# Patient Record
Sex: FEMALE | Race: BLACK OR AFRICAN AMERICAN | Employment: UNEMPLOYED | URBAN - METROPOLITAN AREA
[De-identification: names, ages, dates, MRNs, and addresses within clinical notes are randomized per-mention and may not be internally consistent; named-entity substitution may affect disease eponyms.]

---

## 2017-05-09 ENCOUNTER — HOSPITAL ENCOUNTER (EMERGENCY)
Age: 42
Discharge: HOME | End: 2017-05-09
Attending: EMERGENCY MEDICINE
Payer: MEDICARE

## 2017-05-09 VITALS
SYSTOLIC BLOOD PRESSURE: 125 MMHG | DIASTOLIC BLOOD PRESSURE: 80 MMHG | WEIGHT: 201.2 LBS | TEMPERATURE: 98.2 F | HEART RATE: 106 BPM | OXYGEN SATURATION: 98 % | RESPIRATION RATE: 16 BRPM | HEIGHT: 71 IN | BODY MASS INDEX: 28.17 KG/M2

## 2017-05-09 DIAGNOSIS — H10.211 CHEMICAL CONJUNCTIVITIS, RIGHT: ICD-10-CM

## 2017-05-09 DIAGNOSIS — H10.9 BACTERIAL CONJUNCTIVITIS: Primary | ICD-10-CM

## 2017-05-09 PROCEDURE — 99282 EMERGENCY DEPT VISIT SF MDM: CPT

## 2017-05-09 RX ORDER — KETOROLAC TROMETHAMINE 5 MG/ML
1 SOLUTION OPHTHALMIC 4 TIMES DAILY
Qty: 1 BOTTLE | Refills: 0 | Status: SHIPPED | OUTPATIENT
Start: 2017-05-09 | End: 2017-05-16

## 2017-05-09 RX ORDER — ERYTHROMYCIN 5 MG/G
OINTMENT OPHTHALMIC
Qty: 1 TUBE | Refills: 0 | Status: SHIPPED | OUTPATIENT
Start: 2017-05-09 | End: 2020-09-28

## 2017-05-09 ASSESSMENT — ENCOUNTER SYMPTOMS
SINUS PRESSURE: 0
PHOTOPHOBIA: 0
SORE THROAT: 0
RHINORRHEA: 0
EYE PAIN: 1
EYE REDNESS: 1
EYE DISCHARGE: 1
EYE ITCHING: 1

## 2017-05-09 ASSESSMENT — VISUAL ACUITY
OU: 20/70
OD: SWOLLEN
OS: 20/50

## 2017-05-09 ASSESSMENT — PAIN DESCRIPTION - DESCRIPTORS: DESCRIPTORS: BURNING;CONSTANT;ACHING

## 2017-05-09 ASSESSMENT — PAIN SCALES - GENERAL: PAINLEVEL_OUTOF10: 7

## 2017-05-09 ASSESSMENT — PAIN DESCRIPTION - LOCATION: LOCATION: EYE

## 2017-05-09 ASSESSMENT — PAIN DESCRIPTION - ORIENTATION: ORIENTATION: RIGHT

## 2017-05-15 ENCOUNTER — HOSPITAL ENCOUNTER (EMERGENCY)
Age: 42
Discharge: HOME | End: 2017-05-15
Attending: EMERGENCY MEDICINE
Payer: MEDICARE

## 2017-05-15 VITALS
TEMPERATURE: 97.9 F | OXYGEN SATURATION: 98 % | RESPIRATION RATE: 20 BRPM | HEIGHT: 71 IN | HEART RATE: 110 BPM | BODY MASS INDEX: 27.72 KG/M2 | SYSTOLIC BLOOD PRESSURE: 125 MMHG | WEIGHT: 198 LBS | DIASTOLIC BLOOD PRESSURE: 74 MMHG

## 2017-05-15 DIAGNOSIS — H10.9 CONJUNCTIVITIS OF RIGHT EYE, UNSPECIFIED CONJUNCTIVITIS TYPE: Primary | ICD-10-CM

## 2017-05-15 PROCEDURE — 99283 EMERGENCY DEPT VISIT LOW MDM: CPT

## 2017-05-15 ASSESSMENT — PAIN DESCRIPTION - ORIENTATION: ORIENTATION: RIGHT

## 2017-05-15 ASSESSMENT — ENCOUNTER SYMPTOMS
VOMITING: 0
ABDOMINAL PAIN: 0
CONSTIPATION: 0
FACIAL SWELLING: 0
COUGH: 0
COLOR CHANGE: 0
SHORTNESS OF BREATH: 0
DIARRHEA: 0
EYE DISCHARGE: 1
EYE REDNESS: 1

## 2017-05-15 ASSESSMENT — VISUAL ACUITY: OD: 20/70

## 2017-05-15 ASSESSMENT — PAIN DESCRIPTION - LOCATION: LOCATION: EYE

## 2017-05-15 ASSESSMENT — PAIN DESCRIPTION - DESCRIPTORS: DESCRIPTORS: BURNING

## 2017-05-15 ASSESSMENT — PAIN DESCRIPTION - PAIN TYPE: TYPE: ACUTE PAIN

## 2017-05-15 ASSESSMENT — PAIN SCALES - GENERAL: PAINLEVEL_OUTOF10: 8

## 2020-03-08 ENCOUNTER — HOSPITAL ENCOUNTER (EMERGENCY)
Age: 45
Discharge: HOME | End: 2020-03-08
Attending: EMERGENCY MEDICINE
Payer: COMMERCIAL

## 2020-03-08 VITALS
DIASTOLIC BLOOD PRESSURE: 84 MMHG | SYSTOLIC BLOOD PRESSURE: 126 MMHG | BODY MASS INDEX: 21.92 KG/M2 | TEMPERATURE: 98.4 F | RESPIRATION RATE: 20 BRPM | WEIGHT: 148 LBS | OXYGEN SATURATION: 99 % | HEART RATE: 97 BPM | HEIGHT: 69 IN

## 2020-03-08 LAB — GLUCOSE BLD-MCNC: 202 MG/DL (ref 65–105)

## 2020-03-08 PROCEDURE — 6370000000 HC RX 637 (ALT 250 FOR IP): Performed by: PHYSICIAN ASSISTANT

## 2020-03-08 PROCEDURE — 82947 ASSAY GLUCOSE BLOOD QUANT: CPT

## 2020-03-08 PROCEDURE — 99284 EMERGENCY DEPT VISIT MOD MDM: CPT

## 2020-03-08 RX ORDER — PREGABALIN 150 MG/1
150 CAPSULE ORAL 2 TIMES DAILY
Qty: 28 CAPSULE | Refills: 1 | Status: SHIPPED | OUTPATIENT
Start: 2020-03-08 | End: 2020-09-28 | Stop reason: SDUPTHER

## 2020-03-08 RX ORDER — PREGABALIN 75 MG/1
150 CAPSULE ORAL ONCE
Status: COMPLETED | OUTPATIENT
Start: 2020-03-08 | End: 2020-03-08

## 2020-03-08 RX ADMIN — PREGABALIN 150 MG: 75 CAPSULE ORAL at 17:24

## 2020-03-08 ASSESSMENT — ENCOUNTER SYMPTOMS
VOMITING: 0
SORE THROAT: 0
ABDOMINAL PAIN: 0
SHORTNESS OF BREATH: 0
BACK PAIN: 0
COLOR CHANGE: 0
DIARRHEA: 0
COUGH: 0
NAUSEA: 0

## 2020-03-08 ASSESSMENT — PAIN DESCRIPTION - ONSET: ONSET: ON-GOING

## 2020-03-08 ASSESSMENT — PAIN DESCRIPTION - DESCRIPTORS: DESCRIPTORS: TIGHTNESS;SQUEEZING

## 2020-03-08 ASSESSMENT — PAIN DESCRIPTION - LOCATION: LOCATION: LEG

## 2020-03-08 ASSESSMENT — PAIN DESCRIPTION - ORIENTATION: ORIENTATION: RIGHT;LEFT

## 2020-03-08 ASSESSMENT — PAIN SCALES - GENERAL: PAINLEVEL_OUTOF10: 10

## 2020-03-08 ASSESSMENT — PAIN DESCRIPTION - PAIN TYPE: TYPE: ACUTE PAIN

## 2020-03-08 ASSESSMENT — PAIN DESCRIPTION - PROGRESSION: CLINICAL_PROGRESSION: GRADUALLY WORSENING

## 2020-03-08 ASSESSMENT — PAIN DESCRIPTION - FREQUENCY: FREQUENCY: CONTINUOUS

## 2020-03-08 NOTE — ED NOTES
Pt. Significant other approaches nurses station and states \"I need y'all to get here a pain pill like NOW, she is about to freak out on me,\"  Pt. Screaming from room, \"oh my god are you serious?! I'll just go shoot some fuckin heroin! \"  Pt. Then proceeds to walk with steady gait to restroom with significant other in restroom with her  Pt.  And significant other yelling in bathroom  Security called     Rama Enriquez RN  03/08/20 1884

## 2020-03-08 NOTE — ED NOTES
Pt. Significant other approaches desk again requesting writer to get her medication, \"NOW\"  Pt.  And significant other once again updated that medication has to be ordered and verified by a pharmacist first Jauncarlos Sullivan RN  03/08/20 1018

## 2020-03-08 NOTE — ED PROVIDER NOTES
BIOPSY      TUBAL LIGATION         CURRENT MEDICATIONS       Discharge Medication List as of 3/8/2020  5:07 PM      CONTINUE these medications which have NOT CHANGED    Details   erythromycin (ROMYCIN) 5 MG/GM ophthalmic ointment Instill ~1 cm ribbon into affected eye(s) four times daily. , Disp-1 Tube, R-0, Print      paliperidone palmitate (INVEGA SUSTENNA) 156 MG/ML SUSP IM injection Inject 156 mg into the muscle once, Intramuscular, ONCE, Historical Med      !! hydrOXYzine (VISTARIL) 25 MG capsule Take 1 capsule by mouth 3 times daily as needed for Anxiety, Disp-90 capsule, R-0      divalproex (DEPAKOTE ER) 500 MG ER tablet Take 1 tablet by mouth 2 times daily, Disp-60 tablet, R-0      sertraline (ZOLOFT) 50 MG tablet Take 1 tablet by mouth daily, Disp-30 tablet, R-0      !! hydrOXYzine (VISTARIL) 50 MG capsule Take 50 mg by mouth 3 times daily as needed for Anxiety      budesonide-formoterol (SYMBICORT) 160-4.5 MCG/ACT AERO Inhale 2 puffs into the lungs 2 times daily      albuterol sulfate  (90 BASE) MCG/ACT inhaler Inhale 2 puffs into the lungs every 6 hours as needed for Wheezing      glipiZIDE (GLUCOTROL) 10 MG tablet Take 10 mg by mouth daily       ! ! - Potential duplicate medications found. Please discuss with provider. ALLERGIES     Percocet [oxycodone-acetaminophen] and Percocet [oxycodone-acetaminophen]  Reviewed  FAMILY HISTORY           Problem Relation Age of Onset    Cancer Mother         pancreatic cancer    Diabetes Mother     Hypertension Mother     Emphysema Father      Family Status   Relation Name Status    Mother  (Not Specified)    Father  (Not Specified)        SOCIAL HISTORY      reports that she has been smoking cigarettes. She has a 24.00 pack-year smoking history. She does not have any smokeless tobacco history on file. She reports current alcohol use. She reports that she does not use drugs.     PHYSICAL EXAM    (up to 7 for level 4, 8 or more for level 5) extremities. No calf pain. No palpable cords. No midline lumbar tenderness. No radicular pain. No saddle anesthesia. No bowel or bladder dysfunction. No IV drug use. Low suspicion for epidural abscess or cauda equina syndrome. Ambulatory in the ED. Skin:     General: Skin is warm. Capillary Refill: Capillary refill takes less than 2 seconds. Neurological:      General: No focal deficit present. Mental Status: She is alert and oriented to person, place, and time. Cranial Nerves: No cranial nerve deficit. Psychiatric:         Mood and Affect: Mood normal.         Behavior: Behavior normal.         Thought Content: Thought content normal.         Judgment: Judgment normal.           DIAGNOSTIC RESULTS     No orders to display         LABS:  Labs Reviewed   POC GLUCOSE FINGERSTICK - Abnormal; Notable for the following components:       Result Value    POC Glucose 202 (*)     All other components within normal limits           EMERGENCY DEPARTMENT COURSE and DIFFERENTIAL DIAGNOSIS/MDM:   Vitals:    Vitals:    03/08/20 1635   BP: 126/84   Pulse: 97   Resp: 20   Temp: 98.4 °F (36.9 °C)   TempSrc: Oral   SpO2: 99%   Weight: 148 lb (67.1 kg)   Height: 5' 8.5\" (1.74 m)       This is a 49-year-old female presenting to the emergency department for a refill on Lyrica. She is ambulatory here in the ED. She does states she is diabetic and believes her sugar is under control. We will check a point-of-care glucose at this time. Point-of-care glucose is 202. Vital signs are stable. No acute distress. Nontoxic-appearing. No red flag symptoms. I do not suspect blood clotting. She does have her significant other in the room and they do appear to be high. They keep going to the bathroom and it appears they may be doing drugs. We will give her her Lyrica here in the ED and send her home with a prescription for Lyrica to take as prescribed and as directed.   She was told to continue to take her

## 2020-03-08 NOTE — ED NOTES
When asked about what pharmacy they use to verify dosage patient S.O. states they use several. Wouldnt give a pharmacy name.       Drea Celis RN  03/08/20 [id number] [facility] [place], RN  03/08/20 1303

## 2020-03-08 NOTE — ED PROVIDER NOTES
[address]  ED  eMERGENCY dEPARTMENT eNCOUnter   Attending Attestation     Pt Name: Edelmira Romo  MRN: [id number]  [place] 1975  Date of evaluation: 3/8/20       Edelmira Romo is a 53 y.o. female who presents with Peripheral Neuropathy (bilat legs; last dose of lyrica last night)      History: Patient presents with peripheral neuropathy. Patient says that she has pain in in bilateral legs. Patient said the symptoms are constant and severe. Patient says that she is out of all of her medications and unable to get more because her doctor discharged her for missing 2 appointments. Exam: Heart rate and rhythm are regular. Lungs are clear to auscultation bilaterally. Abdomen soft, nontender. Plan for Giving her a dose of her Lyrica here and discharging on a prescription of the same. I performed a history and physical examination of the patient and discussed management with the resident. I reviewed the residents note and agree with the documented findings and plan of care. Any areas of disagreement are noted on the chart. I was personally present for the key portions of any procedures. I have documented in the chart those procedures where I was not present during the key portions. I have personally reviewed all images and agree with the resident's interpretation. I have reviewed the emergency nurses triage note. I agree with the chief complaint, past medical history, past surgical history, allergies, medications, social and family history as documented unless otherwise noted below. Documentation of the HPI, Physical Exam and Medical Decision Making performed by medical students or scribes is based on my personal performance of the HPI, PE and MDM. For Phys Assistant/ Nurse Practitioner cases/documentation I have had a face to face evaluation of this patient and have completed at least one if not all key elements of the E/M (history, physical exam, and MDM).  Additional findings are as noted. For APC cases I have personally evaluated and examined the patient in conjunction with the APC and agree with the treatment plan and disposition of the patient as recorded by the APC.     Rocco Ospina MD  Attending Emergency  Physician        Valery Linda MD  03/08/20 [facility] Ani Bryan MD  03/08/20 4925

## 2020-09-28 ENCOUNTER — OFFICE VISIT (OUTPATIENT)
Dept: FAMILY MEDICINE CLINIC | Age: 45
End: 2020-09-28
Payer: COMMERCIAL

## 2020-09-28 VITALS
TEMPERATURE: 98.1 F | OXYGEN SATURATION: 97 % | WEIGHT: 145.6 LBS | DIASTOLIC BLOOD PRESSURE: 64 MMHG | HEART RATE: 89 BPM | SYSTOLIC BLOOD PRESSURE: 110 MMHG | BODY MASS INDEX: 21.82 KG/M2

## 2020-09-28 PROBLEM — H02.88A MEIBOMIAN GLAND DYSFUNCTION (MGD), BILATERAL, BOTH UPPER AND LOWER LIDS: Status: ACTIVE | Noted: 2017-05-15

## 2020-09-28 PROBLEM — G47.9 SLEEP DISORDER WITH MOOD COMPLAINTS: Status: ACTIVE | Noted: 2019-01-25

## 2020-09-28 PROBLEM — M21.371 BILATERAL FOOT-DROP: Status: ACTIVE | Noted: 2018-12-10

## 2020-09-28 PROBLEM — M21.372 BILATERAL FOOT-DROP: Status: ACTIVE | Noted: 2018-12-10

## 2020-09-28 PROBLEM — K59.01 SLOW TRANSIT CONSTIPATION: Status: ACTIVE | Noted: 2018-08-21

## 2020-09-28 PROBLEM — H17.10: Status: ACTIVE | Noted: 2017-07-24

## 2020-09-28 PROBLEM — F17.200 TOBACCO DEPENDENCE: Status: ACTIVE | Noted: 2018-04-17

## 2020-09-28 PROBLEM — E78.5 DYSLIPIDEMIA: Status: ACTIVE | Noted: 2017-10-12

## 2020-09-28 PROBLEM — F81.9 MENTAL DEVELOPMENTAL DELAY: Status: ACTIVE | Noted: 2018-08-21

## 2020-09-28 PROBLEM — H02.88B MEIBOMIAN GLAND DYSFUNCTION (MGD), BILATERAL, BOTH UPPER AND LOWER LIDS: Status: ACTIVE | Noted: 2017-05-15

## 2020-09-28 PROBLEM — F31.9 BIPOLAR AFFECTIVE DISORDER (HCC): Status: ACTIVE | Noted: 2017-10-12

## 2020-09-28 PROBLEM — E11.40 CHRONIC PAINFUL DIABETIC NEUROPATHY (HCC): Status: ACTIVE | Noted: 2018-08-21

## 2020-09-28 LAB — HBA1C MFR BLD: 5.9 %

## 2020-09-28 PROCEDURE — 99204 OFFICE O/P NEW MOD 45 MIN: CPT | Performed by: NURSE PRACTITIONER

## 2020-09-28 PROCEDURE — G0008 ADMIN INFLUENZA VIRUS VAC: HCPCS | Performed by: NURSE PRACTITIONER

## 2020-09-28 PROCEDURE — 90715 TDAP VACCINE 7 YRS/> IM: CPT | Performed by: NURSE PRACTITIONER

## 2020-09-28 PROCEDURE — 83036 HEMOGLOBIN GLYCOSYLATED A1C: CPT | Performed by: NURSE PRACTITIONER

## 2020-09-28 PROCEDURE — 90688 IIV4 VACCINE SPLT 0.5 ML IM: CPT | Performed by: NURSE PRACTITIONER

## 2020-09-28 PROCEDURE — 90471 IMMUNIZATION ADMIN: CPT | Performed by: NURSE PRACTITIONER

## 2020-09-28 RX ORDER — TRAZODONE HYDROCHLORIDE 50 MG/1
50 TABLET ORAL NIGHTLY
Qty: 90 TABLET | Refills: 1 | Status: SHIPPED | OUTPATIENT
Start: 2020-09-28 | End: 2020-12-21 | Stop reason: SDUPTHER

## 2020-09-28 RX ORDER — PREGABALIN 150 MG/1
150 CAPSULE ORAL 2 TIMES DAILY
Qty: 28 CAPSULE | Refills: 2 | Status: SHIPPED | OUTPATIENT
Start: 2020-09-28 | End: 2020-12-28

## 2020-09-28 RX ORDER — ATORVASTATIN CALCIUM 10 MG/1
10 TABLET, FILM COATED ORAL DAILY
COMMUNITY
Start: 2019-08-20 | End: 2020-09-28 | Stop reason: SDUPTHER

## 2020-09-28 RX ORDER — ATORVASTATIN CALCIUM 10 MG/1
10 TABLET, FILM COATED ORAL DAILY
Qty: 30 TABLET | Refills: 5 | Status: SHIPPED | OUTPATIENT
Start: 2020-09-28 | End: 2021-02-18

## 2020-09-28 ASSESSMENT — ENCOUNTER SYMPTOMS
CHEST TIGHTNESS: 0
CONSTIPATION: 1
SINUS PRESSURE: 0
SHORTNESS OF BREATH: 0
DIARRHEA: 0
RHINORRHEA: 0
BLOOD IN STOOL: 0
EYE DISCHARGE: 0
VOMITING: 0
COUGH: 0
ABDOMINAL PAIN: 0
NAUSEA: 0

## 2020-09-28 ASSESSMENT — PATIENT HEALTH QUESTIONNAIRE - PHQ9
SUM OF ALL RESPONSES TO PHQ9 QUESTIONS 1 & 2: 0
SUM OF ALL RESPONSES TO PHQ QUESTIONS 1-9: 0
2. FEELING DOWN, DEPRESSED OR HOPELESS: 0
1. LITTLE INTEREST OR PLEASURE IN DOING THINGS: 0
SUM OF ALL RESPONSES TO PHQ QUESTIONS 1-9: 0

## 2020-09-28 NOTE — PROGRESS NOTES
Patient is present to establish care    Patient did not bring medications or a list  Patient is not sure what she is taking, states she is only on 3 medications

## 2020-09-28 NOTE — PROGRESS NOTES
Musculoskeletal: Negative for arthralgias and myalgias. Skin: Negative for rash. Neurological: Negative for dizziness, light-headedness and headaches. Psychiatric/Behavioral: Negative for dysphoric mood and self-injury. Other pertinent ROS in HPI  Objective:     /64 (Site: Left Upper Arm, Position: Sitting, Cuff Size: Medium Adult)   Pulse 89   Temp 98.1 °F (36.7 °C)   Wt 145 lb 9.6 oz (66 kg)   SpO2 97%   BMI 21.82 kg/m²    Physical Exam  Constitutional:       Appearance: She is well-developed. HENT:      Right Ear: External ear normal.      Left Ear: External ear normal.      Nose: Nose normal.   Neck:      Musculoskeletal: Full passive range of motion without pain and normal range of motion. Cardiovascular:      Rate and Rhythm: Normal rate and regular rhythm. Heart sounds: Normal heart sounds, S1 normal and S2 normal.   Pulmonary:      Effort: Pulmonary effort is normal. No respiratory distress. Breath sounds: Normal breath sounds. Musculoskeletal: Normal range of motion. General: No deformity. Skin:     General: Skin is warm and dry. Neurological:      Mental Status: She is alert and oriented to person, place, and time. Assessment/PLAN   1. Type 2 diabetes mellitus with diabetic polyneuropathy, without long-term current use of insulin (HCC)    - CBC; Future  - Comprehensive Metabolic Panel; Future  - Lipid, Fasting; Future  - POCT glycosylated hemoglobin (Hb A1C)  - pregabalin (LYRICA) 150 MG capsule; Take 1 capsule by mouth 2 times daily for 28 days. Dispense: 28 capsule; Refill: 2  - atorvastatin (LIPITOR) 10 MG tablet; Take 1 tablet by mouth daily  Dispense: 30 tablet; Refill: 5  - SITagliptin (JANUVIA) 50 MG tablet; Take 1 tablet by mouth daily  Dispense: 30 tablet; Refill: 5    2. Need for influenza vaccination    - INFLUENZA, QUADV, 3 YRS AND OLDER, IM, MDV, 0.5ML (Tatiana Engle)    3.  Need for Tdap vaccination    - Tdap (age 10y-63y) IM (ADACEL)    4. Encounter for medication refill    - pregabalin (LYRICA) 150 MG capsule; Take 1 capsule by mouth 2 times daily for 28 days. Dispense: 28 capsule; Refill: 2    5. Screening for thyroid disorder    - TSH With Reflex Ft4; Future    6. Medication refill    - traZODone (DESYREL) 50 MG tablet; Take 1 tablet by mouth nightly  Dispense: 90 tablet; Refill: 1    7. Screening for HIV (human immunodeficiency virus)    - HIV Screen; Future    8. Encounter to establish care        RTO if symptoms worsen or fail to improve  Pt agreeable with plan      Patient given educational materials - see patientinstructions. Discussed use, benefit, and side effects of prescribed medications. All patient questions answered. Pt voiced understanding. Reviewed health maintenance. Instructed to continue current medications, diet andexercise. 1.  Tishonda received counseling on the following healthy behaviors: nutrition, exercise and medication adherence  2. Patient given educationalmaterials when available - see patient instructions when applicable  3. Discussed use, benefit, and side effects of prescribed medications. Barriersto medication compliance addressed. All patient questions answered. Pt voiced understanding. 4.  Reviewed prior labs and health maintenance when applicable. 5.  Continue current medications, diet and exercise. CompletedRefills   Requested Prescriptions     Signed Prescriptions Disp Refills    pregabalin (LYRICA) 150 MG capsule 28 capsule 2     Sig: Take 1 capsule by mouth 2 times daily for 28 days.  atorvastatin (LIPITOR) 10 MG tablet 30 tablet 5     Sig: Take 1 tablet by mouth daily    SITagliptin (JANUVIA) 50 MG tablet 30 tablet 5     Sig: Take 1 tablet by mouth daily    traZODone (DESYREL) 50 MG tablet 90 tablet 1     Sig: Take 1 tablet by mouth nightly       This note was transcribed using dictation with Dragon services.  Efforts were made to correct any errors but some words may be misinterpreted.     Electronically signed by Seth Gomez CNP on 9/28/2020 at 9:38 AM

## 2020-12-21 DIAGNOSIS — Z76.0 MEDICATION REFILL: ICD-10-CM

## 2020-12-21 NOTE — TELEPHONE ENCOUNTER
Patient states she will be gone \"awhile\" when asked what she meant she stated a month or so.   Checked with local pharmacy, she has 6 days left and if she is going to [facility] in different city they can fill there since they are all connected

## 2020-12-21 NOTE — TELEPHONE ENCOUNTER
Patient called requesting refill on:  -Trazadone    [facility] [address]

## 2020-12-21 NOTE — TELEPHONE ENCOUNTER
LM for patient to call the office. Patient is out of town and needing refills but forgot to ask how long she will be out of town so we know how much she is needing.

## 2020-12-23 RX ORDER — TRAZODONE HYDROCHLORIDE 50 MG/1
50 TABLET ORAL NIGHTLY
Qty: 30 TABLET | Refills: 2 | Status: SHIPPED | OUTPATIENT
Start: 2020-12-23 | End: 2021-02-18 | Stop reason: SDUPTHER

## 2020-12-28 RX ORDER — PREGABALIN 150 MG/1
CAPSULE ORAL
Qty: 28 CAPSULE | Refills: 0 | Status: SHIPPED | OUTPATIENT
Start: 2020-12-28 | End: 2021-02-18 | Stop reason: SDUPTHER

## 2020-12-28 NOTE — TELEPHONE ENCOUNTER
Last visit: 9/28/20  Last Med refill: 9/28/20  Does patient have enough medication for 72 hours: No:     PATIENT IS CURRENTLY OUT OF TOWN IN [place]    Next Visit Date:  Future Appointments   Date Time Provider Eder Fox   1/4/2021  1:00 PM LYLY Martinez - CNP [facility] [address] Maintenance   Topic Date Due    Hepatitis C screen  1975    Diabetic foot exam  08/18/1983    Diabetic retinal exam  08/18/1983    Lipid screen  08/18/1983    HIV screen  08/18/1988    Diabetic microalbuminuria test  08/18/1991    Hepatitis B vaccine (1 of 3 - Risk 3-dose series) 08/18/1992    Annual Wellness Visit (AWV)  06/23/2019    Cervical cancer screen  12/21/2020    A1C test (Diabetic or Prediabetic)  09/28/2021    DTaP/Tdap/Td vaccine (2 - Td) 09/28/2030    Flu vaccine  Completed    Pneumococcal 0-64 years Vaccine  Completed    Hepatitis A vaccine  Aged Out    Hib vaccine  Aged Out    Meningococcal (ACWY) vaccine  Aged Out       Hemoglobin A1C (%)   Date Value   09/28/2020 5.9             ( goal A1C is < 7)   No results found for: LABMICR  No results found for: LDLCHOLESTEROL, LDLCALC    (goal LDL is <100)   AST (U/L)   Date Value   03/17/2016 21     ALT (U/L)   Date Value   03/17/2016 21     BUN (mg/dL)   Date Value   03/17/2016 11     BP Readings from Last 3 Encounters:   09/28/20 110/64   03/08/20 126/84   05/15/17 125/74          (goal 120/80)    All Future Testing planned in [facility]  Lab Frequency Next Occurrence   CBC Once 03/28/2021   Comprehensive Metabolic Panel Once 53/21/7285   Lipid, Fasting Once 03/28/2021   TSH With Reflex Ft4 Once 03/28/2021   HIV Screen Once 12/20/2020               Patient Active Problem List:     Chronic painful diabetic neuropathy ([address]     Corneal opacity, central     Dyslipidemia     Meibomian gland dysfunction (MGD), bilateral, both upper and lower lids     Mental developmental delay     Sleep disorder with mood complaints     Slow transit constipation     Tobacco dependence     Bilateral foot-drop     Bipolar affective disorder ([facility] [address]

## 2021-01-08 DIAGNOSIS — Z76.0 ENCOUNTER FOR MEDICATION REFILL: ICD-10-CM

## 2021-01-08 DIAGNOSIS — E11.42 TYPE 2 DIABETES MELLITUS WITH DIABETIC POLYNEUROPATHY, WITHOUT LONG-TERM CURRENT USE OF INSULIN (HCC): ICD-10-CM

## 2021-01-08 RX ORDER — PREGABALIN 150 MG/1
CAPSULE ORAL
Qty: 28 CAPSULE | OUTPATIENT
Start: 2021-01-08 | End: 2021-01-22

## 2021-01-08 NOTE — TELEPHONE ENCOUNTER
Patient called and left message on vm for refill on pregabalin. Tried calling patient back at number she gave [phone] and mailbox is full.  Patient needs appointment and if she is OOT then she can do virtual with Charlie Aguilar for refill

## 2021-01-11 NOTE — TELEPHONE ENCOUNTER
Patient called stating she would like the dose of Lyrica changed. Informed her this is a controlled medication and she needs an appointment for this. Patient states she is out of town. Offered her a virtual visit. Patient was discussing this was someone in the background and then had him talk to me. States he was her  and he wanted to know why patient could not get medication. Informed him this is a controlled medication and she has to be seen. Informed him that it was called in prior as a courtesy since she was out of town but anything further she needs an appointment. Patient got back on the phone and scheduled for 1/21.

## 2021-01-21 ENCOUNTER — TELEPHONE (OUTPATIENT)
Dept: FAMILY MEDICINE CLINIC | Age: 46
End: 2021-01-21

## 2021-01-21 NOTE — TELEPHONE ENCOUNTER
Called patient to get her checked in for virtual appt. Spoke with someone and they stated patient was not there. Informed him she has an appt this morning and asked if he can have her give the office a call. Verbally understood.

## 2021-02-10 ENCOUNTER — TELEPHONE (OUTPATIENT)
Dept: FAMILY MEDICINE CLINIC | Age: 46
End: 2021-02-10

## 2021-02-10 NOTE — TELEPHONE ENCOUNTER
Patient called asking why nobody contacted her for her 10am virtual visit yesterday. Informed her we called her but at the leave a message. Patient asked what she was supposed to do now. Patient states she would have to go to the ER. Informed her she has to be reschedule and has to have that appt before her refills can be filled. Verbally understood. Scheduled for 2/18.

## 2021-02-11 ENCOUNTER — TELEPHONE (OUTPATIENT)
Dept: FAMILY MEDICINE CLINIC | Age: 46
End: 2021-02-11

## 2021-02-11 NOTE — TELEPHONE ENCOUNTER
Pt would like to receive doxy link to her phone for her appt.  Link can be texted/sent to [phone]

## 2021-02-18 ENCOUNTER — VIRTUAL VISIT (OUTPATIENT)
Dept: FAMILY MEDICINE CLINIC | Age: 46
End: 2021-02-18
Payer: MEDICAID

## 2021-02-18 DIAGNOSIS — Z76.0 ENCOUNTER FOR MEDICATION REFILL: ICD-10-CM

## 2021-02-18 DIAGNOSIS — Z76.0 MEDICATION REFILL: ICD-10-CM

## 2021-02-18 DIAGNOSIS — E11.42 TYPE 2 DIABETES MELLITUS WITH DIABETIC POLYNEUROPATHY, WITHOUT LONG-TERM CURRENT USE OF INSULIN (HCC): ICD-10-CM

## 2021-02-18 PROCEDURE — 99213 OFFICE O/P EST LOW 20 MIN: CPT | Performed by: NURSE PRACTITIONER

## 2021-02-18 RX ORDER — PREGABALIN 150 MG/1
CAPSULE ORAL
Qty: 28 CAPSULE | Refills: 2 | Status: SHIPPED | OUTPATIENT
Start: 2021-02-18 | End: 2021-04-02 | Stop reason: SDUPTHER

## 2021-02-18 RX ORDER — TRAZODONE HYDROCHLORIDE 50 MG/1
50 TABLET ORAL NIGHTLY
Qty: 30 TABLET | Refills: 2 | Status: SHIPPED | OUTPATIENT
Start: 2021-02-18 | End: 2021-06-09 | Stop reason: SDUPTHER

## 2021-02-18 SDOH — ECONOMIC STABILITY: TRANSPORTATION INSECURITY
IN THE PAST 12 MONTHS, HAS LACK OF TRANSPORTATION KEPT YOU FROM MEETINGS, WORK, OR FROM GETTING THINGS NEEDED FOR DAILY LIVING?: YES

## 2021-02-18 SDOH — ECONOMIC STABILITY: FOOD INSECURITY: WITHIN THE PAST 12 MONTHS, YOU WORRIED THAT YOUR FOOD WOULD RUN OUT BEFORE YOU GOT MONEY TO BUY MORE.: OFTEN TRUE

## 2021-02-18 ASSESSMENT — ENCOUNTER SYMPTOMS
COUGH: 0
SHORTNESS OF BREATH: 0

## 2021-02-18 NOTE — PROGRESS NOTES
[x] Alert and awake  [x] Oriented to person/place/time [x]Able to follow commands       Eyes:  EOM    [x]  Normal  [] Abnormal-  Sclera  [x]  Normal  [] Abnormal -         Discharge [x]  None visible  [] Abnormal -     HENT:   [x] Normocephalic, atraumatic. [] Abnormal   [] Mouth/Throat: Mucous membranes are moist.      External Ears [x] Normal  [] Abnormal-      Neck: [x] No visualized mass      Pulmonary/Chest: [x] Respiratory effort normal.  [] No visualized signs of difficulty breathing or respiratory distress        [] Abnormal-      Musculoskeletal:   [] Normal gait with no signs of ataxia         [x] Normal range of motion of neck        [] Abnormal-   [] Motor grossly intact in visible upper extremities    [] Motor grossly intact in visible lower extremities        Neurological:        [x] No Facial Asymmetry (Cranial nerve 7 motor function) (limited exam to video visit)                       [x] No gaze palsy        [] Abnormal-         Skin:                     [x] No significant exanthematous lesions or discoloration noted on facial skin         [] Abnormal-                                  Psychiatric:           [x] Normal Affect [] No Hallucinations        [] Abnormal- [] Normal Mood  [] Anxious appearing    [] Depressed appearing  [] Confused appearing      Due to this being a TeleHealth encounter, evaluation of the following organ systems is limited: Vitals/Constitutional/EENT/Resp/CV/GI//MS/Neuro/Skin/Heme-Lymph-Imm. ASSESSMENT/PLAN:  Encounter Diagnoses   Name Primary?     Encounter for medication refill     Type 2 diabetes mellitus with diabetic polyneuropathy, without long-term current use of insulin (HCC)     Medication refill        Orders Placed This Encounter   Medications    pregabalin (LYRICA) 150 MG capsule     Sig: take 1 capsule by mouth twice a day     Dispense:  28 capsule     Refill:  2    traZODone (DESYREL) 50 MG tablet     Sig: Take 1 tablet by mouth nightly Dispense:  30 tablet     Refill:  2         No follow-ups on file. An  electronic signature was used to authenticate this note. --LYLY Carrera - CNP on 2/18/2021 at 10:40 AM        Pursuant to the emergency declaration under the [address] [place] [address], [facility] waiver authority and the [employer] Act, this Virtual  Visit was conducted, with patient's consent, to reduce the patient's risk of exposure to COVID-19 and provide continuity of care for an established patient. Services were provided through a telephone discussion virtually to substitute for in-person clinic visit.

## 2021-02-19 ENCOUNTER — NURSE TRIAGE (OUTPATIENT)
Dept: OTHER | Age: 46
End: 2021-02-19

## 2021-02-19 RX ORDER — DEXTROMETHORPHAN POLISTIREX 30 MG/5ML
30 SUSPENSION ORAL 2 TIMES DAILY PRN
Qty: 50 ML | Refills: 0 | Status: SHIPPED | OUTPATIENT
Start: 2021-02-19 | End: 2021-03-01

## 2021-02-19 NOTE — TELEPHONE ENCOUNTER
Chaz Garcia states that one of her medications has been refused by the pharmacy. Writer called the pharmacy and the medication is over the counter and not covered by her insurance. Patient may call the office on Monday or purchase the medication OTC.

## 2021-02-22 ENCOUNTER — TELEPHONE (OUTPATIENT)
Dept: FAMILY MEDICINE CLINIC | Age: 46
End: 2021-02-22

## 2021-02-22 NOTE — TELEPHONE ENCOUNTER
Patient called and said that cough med isn't covered by insurance and would like a different med called in.

## 2021-02-23 ENCOUNTER — TELEPHONE (OUTPATIENT)
Dept: FAMILY MEDICINE CLINIC | Age: 46
End: 2021-02-23

## 2021-02-23 NOTE — TELEPHONE ENCOUNTER
Patient called stating insurance does not cover the cough syrup that was sent in and is asking if you can send something different in. Offered patient samples and she stated she does not have a way to come to the office. Also stated she cannot afford to buy otc.

## 2021-02-24 NOTE — TELEPHONE ENCOUNTER
Krish Caldwell called due to not having any money and no one can loan/give her the money to get the OTC cough syrup. She asked for something else to be called in that is covered by her card. I advised her to call INS to see what is covered. I explained we don't know what is covered by her insurance and could keep sending in med's that are not covered.   Krish Caldwell stated understanding and said will call us back after speaking with INS

## 2021-03-30 DIAGNOSIS — E11.42 TYPE 2 DIABETES MELLITUS WITH DIABETIC POLYNEUROPATHY, WITHOUT LONG-TERM CURRENT USE OF INSULIN (HCC): ICD-10-CM

## 2021-03-30 DIAGNOSIS — Z76.0 ENCOUNTER FOR MEDICATION REFILL: ICD-10-CM

## 2021-03-30 RX ORDER — PREGABALIN 150 MG/1
CAPSULE ORAL
Qty: 28 CAPSULE | OUTPATIENT
Start: 2021-03-30

## 2021-03-30 NOTE — TELEPHONE ENCOUNTER
Last visit: 02/18/2021  Last Med refill: unknown  Does patient have enough medication for 72 hours: No:     Next Visit Date:  No future appointments.     Health Maintenance   Topic Date Due    Hepatitis C screen  Never done    Diabetic foot exam  Never done    Diabetic retinal exam  Never done    Lipid screen  Never done    HIV screen  Never done    COVID-19 Vaccine (1) Never done    Diabetic microalbuminuria test  Never done    Hepatitis B vaccine (1 of 3 - Risk 3-dose series) Never done    Cervical cancer screen  12/21/2018    Annual Wellness Visit (AWV)  Never done    A1C test (Diabetic or Prediabetic)  09/28/2021    DTaP/Tdap/Td vaccine (2 - Td) 09/28/2030    Flu vaccine  Completed    Pneumococcal 0-64 years Vaccine  Completed    Hepatitis A vaccine  Aged Out    Hib vaccine  Aged Out    Meningococcal (ACWY) vaccine  Aged Out       Hemoglobin A1C (%)   Date Value   09/28/2020 5.9             ( goal A1C is < 7)   No results found for: LABMICR  No results found for: LDLCHOLESTEROL, LDLCALC    (goal LDL is <100)   AST (U/L)   Date Value   03/17/2016 21     ALT (U/L)   Date Value   03/17/2016 21     BUN (mg/dL)   Date Value   03/17/2016 11     BP Readings from Last 3 Encounters:   09/28/20 110/64   03/08/20 126/84   05/15/17 125/74          (goal 120/80)    All Future Testing planned in [facility]  Lab Frequency Next Occurrence   CBC Once 03/28/2021   Comprehensive Metabolic Panel Once 11/81/2194   Lipid, Fasting Once 03/28/2021   TSH With Reflex Ft4 Once 03/28/2021   HIV Screen Once 04/11/2021               Patient Active Problem List:     Chronic painful diabetic neuropathy ([address]     Corneal opacity, central     Dyslipidemia     Meibomian gland dysfunction (MGD), bilateral, both upper and lower lids     Mental developmental delay     Sleep disorder with mood complaints     Slow transit constipation     Tobacco dependence     Bilateral foot-drop     Bipolar affective disorder ([address]

## 2021-04-01 RX ORDER — PREGABALIN 150 MG/1
CAPSULE ORAL
Qty: 60 CAPSULE | Refills: 2 | OUTPATIENT
Start: 2021-04-01 | End: 2021-06-27

## 2021-04-02 RX ORDER — PREGABALIN 150 MG/1
CAPSULE ORAL
Qty: 28 CAPSULE | Refills: 2 | Status: SHIPPED | OUTPATIENT
Start: 2021-04-02 | End: 2021-04-12 | Stop reason: SDUPTHER

## 2021-04-02 NOTE — TELEPHONE ENCOUNTER
Pt called in regards to her refill on lyrica. I called pharmacy and they stated last  was 03/15/2021 for 14 days. Pt is currently out of medication.

## 2021-04-07 DIAGNOSIS — E11.42 TYPE 2 DIABETES MELLITUS WITH DIABETIC POLYNEUROPATHY, WITHOUT LONG-TERM CURRENT USE OF INSULIN (HCC): ICD-10-CM

## 2021-04-07 DIAGNOSIS — Z76.0 ENCOUNTER FOR MEDICATION REFILL: ICD-10-CM

## 2021-04-07 NOTE — TELEPHONE ENCOUNTER
Pt called and states her Lyrica needs to be sent to [facility] in [facility]. Pt states she is out of town and has no way of getting her lyrica from [facility]. [facility] [address], states phone is out of service. [facility] and the medication was filled but never picked up from 04/02/2021. [facility] and sent to [facility] in [place], [id number] [facility], med is pending.

## 2021-04-14 RX ORDER — PREGABALIN 150 MG/1
CAPSULE ORAL
Qty: 28 CAPSULE | Refills: 1 | Status: SHIPPED | OUTPATIENT
Start: 2021-04-14 | End: 2021-06-09 | Stop reason: SDUPTHER

## 2021-04-18 ENCOUNTER — NURSE TRIAGE (OUTPATIENT)
Dept: OTHER | Age: 46
End: 2021-04-18

## 2021-04-18 NOTE — TELEPHONE ENCOUNTER
Patient called stating that she is out of Lyrica. She has been taking more than prescribed because it is not covering her pain. Patient referred to the office in the morning.

## 2021-04-18 NOTE — TELEPHONE ENCOUNTER
Reason for Disposition   [1] Caller requesting a NON-URGENT new prescription or refill AND [2] triager unable to refill per unit policy    Answer Assessment - Initial Assessment Questions  1. NAME of MEDICATION: \"What medicine are you calling about? \"      Lyrica   2. QUESTION: Mee Barton is your question? \"      Patient is out of medication. She has been using more than prescribed because it is not covering her pain. 3.   PRESCRIBING HCP: \"Who prescribed it? \" Reason: if prescribed by specialist, call should be referred to that group. Nathaniel Christina    4. SYMPTOMS: \"Do you have any symptoms? \"      Pain    5. SEVERITY: If symptoms are present, ask \"Are they mild, moderate or severe? \"      Severe    6. PREGNANCY:  \"Is there any chance that you are pregnant? \" \"When was your last menstrual period? \"      *No Answer*    Protocols used: MEDICATION QUESTION CALL-ADULT-

## 2021-04-30 ENCOUNTER — TELEPHONE (OUTPATIENT)
Dept: FAMILY MEDICINE CLINIC | Age: 46
End: 2021-04-30

## 2021-04-30 NOTE — TELEPHONE ENCOUNTER
Patient called asking if her lyrica was sent in. Informed her this was sent to [facility] in [place], [place] on 4/14 with 1 refill. Patient states she has moved and does not have her prescription bottle to call for a refill. Provided patient with the pharmacy number. Also advised patient she will need to have a pcp down there since she has moved out of town and will need appts for this type of medication. Patient verbalized understanding, states she will call around to find a new provider.

## 2021-05-03 DIAGNOSIS — Z76.0 ENCOUNTER FOR MEDICATION REFILL: ICD-10-CM

## 2021-05-03 DIAGNOSIS — E11.42 TYPE 2 DIABETES MELLITUS WITH DIABETIC POLYNEUROPATHY, WITHOUT LONG-TERM CURRENT USE OF INSULIN (HCC): ICD-10-CM

## 2021-05-03 RX ORDER — PREGABALIN 150 MG/1
CAPSULE ORAL
Qty: 28 CAPSULE | Refills: 1 | Status: CANCELLED | OUTPATIENT
Start: 2021-05-03 | End: 2021-07-24

## 2021-06-09 ENCOUNTER — HOSPITAL ENCOUNTER (OUTPATIENT)
Age: 46
Setting detail: SPECIMEN
Discharge: HOME | End: 2021-06-09
Payer: COMMERCIAL

## 2021-06-09 ENCOUNTER — OFFICE VISIT (OUTPATIENT)
Dept: FAMILY MEDICINE CLINIC | Age: 46
End: 2021-06-09
Payer: COMMERCIAL

## 2021-06-09 VITALS
HEIGHT: 69 IN | WEIGHT: 144 LBS | RESPIRATION RATE: 20 BRPM | OXYGEN SATURATION: 98 % | SYSTOLIC BLOOD PRESSURE: 112 MMHG | DIASTOLIC BLOOD PRESSURE: 70 MMHG | BODY MASS INDEX: 21.33 KG/M2 | HEART RATE: 100 BPM

## 2021-06-09 DIAGNOSIS — Z79.899 MEDICATION MANAGEMENT: ICD-10-CM

## 2021-06-09 DIAGNOSIS — R41.3 POOR SHORT-TERM MEMORY: ICD-10-CM

## 2021-06-09 DIAGNOSIS — R30.0 DYSURIA: ICD-10-CM

## 2021-06-09 DIAGNOSIS — Z76.0 MEDICATION REFILL: ICD-10-CM

## 2021-06-09 DIAGNOSIS — G47.9 SLEEP DISTURBANCE: ICD-10-CM

## 2021-06-09 DIAGNOSIS — E11.40 CHRONIC PAINFUL DIABETIC NEUROPATHY (HCC): ICD-10-CM

## 2021-06-09 DIAGNOSIS — Z76.0 ENCOUNTER FOR MEDICATION REFILL: Primary | ICD-10-CM

## 2021-06-09 DIAGNOSIS — E11.42 TYPE 2 DIABETES MELLITUS WITH DIABETIC POLYNEUROPATHY, WITHOUT LONG-TERM CURRENT USE OF INSULIN (HCC): ICD-10-CM

## 2021-06-09 LAB
BILIRUBIN, POC: NEGATIVE
BLOOD URINE, POC: ABNORMAL
CLARITY, POC: ABNORMAL
COLOR, POC: ABNORMAL
CREATININE URINE POCT: 100
GLUCOSE URINE, POC: NEGATIVE
HBA1C MFR BLD: 5.5 %
KETONES, POC: NEGATIVE
LEUKOCYTE EST, POC: ABNORMAL
MICROALBUMIN/CREAT 24H UR: 10 MG/G{CREAT}
MICROALBUMIN/CREAT UR-RTO: <30
NITRITE, POC: NEGATIVE
PH, POC: 5
PROTEIN, POC: NEGATIVE
SPECIFIC GRAVITY, POC: <1.005
UROBILINOGEN, POC: 0.2

## 2021-06-09 PROCEDURE — 83036 HEMOGLOBIN GLYCOSYLATED A1C: CPT | Performed by: NURSE PRACTITIONER

## 2021-06-09 PROCEDURE — 81002 URINALYSIS NONAUTO W/O SCOPE: CPT | Performed by: NURSE PRACTITIONER

## 2021-06-09 PROCEDURE — 99213 OFFICE O/P EST LOW 20 MIN: CPT | Performed by: NURSE PRACTITIONER

## 2021-06-09 PROCEDURE — 82044 UR ALBUMIN SEMIQUANTITATIVE: CPT | Performed by: NURSE PRACTITIONER

## 2021-06-09 RX ORDER — PREGABALIN 150 MG/1
CAPSULE ORAL
Qty: 60 CAPSULE | Refills: 0 | Status: CANCELLED | OUTPATIENT
Start: 2021-06-09 | End: 2021-08-30

## 2021-06-09 RX ORDER — PREGABALIN 150 MG/1
CAPSULE ORAL
Qty: 28 CAPSULE | Refills: 1 | Status: SHIPPED | OUTPATIENT
Start: 2021-06-09 | End: 2021-12-03 | Stop reason: SDUPTHER

## 2021-06-09 RX ORDER — TRAZODONE HYDROCHLORIDE 100 MG/1
100 TABLET ORAL NIGHTLY
Qty: 30 TABLET | Refills: 2 | Status: SHIPPED | OUTPATIENT
Start: 2021-06-09 | End: 2021-12-12 | Stop reason: SDUPTHER

## 2021-06-09 ASSESSMENT — ENCOUNTER SYMPTOMS
SHORTNESS OF BREATH: 0
DIARRHEA: 0
COLOR CHANGE: 0
SINUS PRESSURE: 0
NAUSEA: 0
CHEST TIGHTNESS: 0
TROUBLE SWALLOWING: 0
EYE PAIN: 0
PHOTOPHOBIA: 0
VOMITING: 0
SORE THROAT: 0
SINUS PAIN: 0
ABDOMINAL PAIN: 0
CONSTIPATION: 0
BACK PAIN: 0
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
## 2021-06-11 ENCOUNTER — TELEPHONE (OUTPATIENT)
Dept: FAMILY MEDICINE CLINIC | Age: 46
End: 2021-06-11

## 2021-06-11 DIAGNOSIS — R11.0 NAUSEA: Primary | ICD-10-CM

## 2021-06-11 LAB
CULTURE: ABNORMAL
Lab: ABNORMAL
SPECIMEN DESCRIPTION: ABNORMAL

## 2021-06-11 RX ORDER — ONDANSETRON 4 MG/1
4 TABLET, FILM COATED ORAL 3 TIMES DAILY PRN
Qty: 15 TABLET | Refills: 0 | Status: SHIPPED | OUTPATIENT
Start: 2021-06-11 | End: 2021-12-23 | Stop reason: SDUPTHER

## 2021-06-11 NOTE — TELEPHONE ENCOUNTER
Patient called and said she forgot to mention at her appointment that she is having nausea and vomiting and sometimes can't eat.  Has tried antacids and pepto and they have not helped would like rx called in

## 2021-06-11 NOTE — TELEPHONE ENCOUNTER
I will send in a short course of zofran, she needs to be seen with this. If it becomes severe she needs to be evaluated.

## 2021-06-12 LAB
6-ACETYLMORPHINE, UR: NOT DETECTED
7-AMINOCLONAZEPAM, URINE: NOT DETECTED
ALPHA-OH-ALPRAZ, URINE: NOT DETECTED
ALPHA-OH-MIDAZOLAM, URINE: NOT DETECTED
ALPRAZOLAM, URINE: NOT DETECTED
AMPHETAMINES, URINE: NOT DETECTED
BARBITURATES, URINE: NOT DETECTED
BENZOYLECGONINE, UR: NOT DETECTED
BUPRENORPHINE URINE: NOT DETECTED
CARISOPRODOL, UR: NOT DETECTED
CLONAZEPAM, URINE: NOT DETECTED
CODEINE, URINE: NOT DETECTED
CREATININE URINE: 109.3 MG/DL (ref 20–400)
DIAZEPAM, URINE: NOT DETECTED
DRUGS EXPECTED, UR: NORMAL
EER HI RES INTERP UR: NORMAL
ETHYL GLUCURONIDE UR: PRESENT
FENTANYL URINE: NOT DETECTED
GABAPENTIN: NOT DETECTED
HYDROCODONE, URINE: NOT DETECTED
HYDROMORPHONE, URINE: NOT DETECTED
LORAZEPAM, URINE: NOT DETECTED
MARIJUANA METAB, UR: NOT DETECTED
MDA, UR: NOT DETECTED
MDEA, EVE, UR: NOT DETECTED
MDMA URINE: NOT DETECTED
MEPERIDINE METAB, UR: NOT DETECTED
METHADONE, URINE: NOT DETECTED
METHAMPHETAMINE, URINE: NOT DETECTED
METHYLPHENIDATE: NOT DETECTED
MIDAZOLAM, URINE: NOT DETECTED
MORPHINE URINE: NOT DETECTED
NALOXONE URINE: NOT DETECTED
NORBUPRENORPHINE, URINE: NOT DETECTED
NORDIAZEPAM, URINE: NOT DETECTED
NORFENTANYL, URINE: NOT DETECTED
NORHYDROCODONE, URINE: NOT DETECTED
NOROXYCODONE, URINE: NOT DETECTED
NOROXYMORPHONE, URINE: NOT DETECTED
OXAZEPAM, URINE: NOT DETECTED
OXYCODONE URINE: NOT DETECTED
OXYMORPHONE, URINE: NOT DETECTED
PAIN MANAGEMENT DRUG PANEL INTERP, URINE: NORMAL
PAIN MGT DRUG PANEL, HI RES, UR: NORMAL
PCP,URINE: NOT DETECTED
PHENTERMINE, UR: NOT DETECTED
PREGABALIN: PRESENT
TAPENTADOL, URINE: NOT DETECTED
TAPENTADOL-O-SULFATE, URINE: NOT DETECTED
TEMAZEPAM, URINE: NOT DETECTED
TRAMADOL, URINE: NOT DETECTED
ZOLPIDEM METABOLITE (ZCA), URINE: NOT DETECTED
ZOLPIDEM, URINE: NOT DETECTED

## 2021-06-14 ENCOUNTER — TELEPHONE (OUTPATIENT)
Dept: FAMILY MEDICINE CLINIC | Age: 46
End: 2021-06-14

## 2021-06-14 DIAGNOSIS — N30.00 ACUTE CYSTITIS WITHOUT HEMATURIA: Primary | ICD-10-CM

## 2021-06-14 RX ORDER — CIPROFLOXACIN 250 MG/1
250 TABLET, FILM COATED ORAL 2 TIMES DAILY
Qty: 6 TABLET | Refills: 0 | Status: SHIPPED | OUTPATIENT
Start: 2021-06-14 | End: 2021-06-17

## 2021-06-14 NOTE — TELEPHONE ENCOUNTER
Romana Liner, [address] [facility] [place]   6/14/2021  4:01 PM EDT Back to Top      Informed patient of results. Informed patient she will no longer be getting this medication from our office. Patient stated she had to wait to get an appt for this medication and she did not have any. Explained to her that if that were the case then she should not have any lyrica in her system. Informed her this was a void in her medication contract. Patient asked how long she has to wait until she can get it again. Informed her she cannot get this from our office any longer. Patient verbalized understanding and stated she stated \"this sh*t is crazy. I'll be finding a new doctor. \" patient ended the call. LYLY Noel NP   6/14/2021  3:01 PM EDT       Please call and let her know her urine culture came back positive for E.  Coli, I will send in an antibiotic for her to take. Jean Paul Erickson, her urine screen came back positive for lyrica - as she should have had none in her system we unfortunately can no longer prescribe this for her.

## 2021-06-14 NOTE — TELEPHONE ENCOUNTER
Patient was again  informed that this office is not able to give Lyrica prescription , patient disagrees with this decision and states that she is in pain and that she\" has taken father prescription\". Patient was also informed again that she was in violation of medication contract signed in office and failed to pass drug screen, with this medication. Patient also asked and was informed that she \"in not discharged from this practice based on these findings\". Patient became insistent on return phone call from provider regarding this issue.

## 2021-07-09 ENCOUNTER — TELEPHONE (OUTPATIENT)
Dept: FAMILY MEDICINE CLINIC | Age: 46
End: 2021-07-09

## 2021-07-09 NOTE — TELEPHONE ENCOUNTER
----- Message from Zenobia Vannamarys sent at 7/9/2021  1:59 PM EDT -----  Subject: Refill Request    QUESTIONS  Name of Medication? pregabalin (LYRICA) 150 MG capsule  Patient-reported dosage and instructions? pt takes one 150 mg pregabalin   capsule in the morning and at night   How many days do you have left? 0  Preferred Pharmacy? [address] phone number (if available)? [phone]  Additional Information for Provider? Pt wants to know why the other   prescriptions were called on but the pregabalin was not called in.   ---------------------------------------------------------------------------  --------------  CALL BACK INFO  What is the best way for the office to contact you? OK to leave message on   voicemail  Preferred Call Back Phone Number?  [phone]

## 2021-07-09 NOTE — TELEPHONE ENCOUNTER
Spoke to patient. Advised her that she was informed twice in June that our office can no longer prescribe Pregabalin (lyrica). Informed patient she was also informed twice today but other staff members.  patient stated Ok and hung up

## 2021-07-21 ENCOUNTER — HOSPITAL ENCOUNTER (OUTPATIENT)
Age: 46
Setting detail: SPECIMEN
Discharge: HOME | End: 2021-07-21
Payer: COMMERCIAL

## 2021-07-21 LAB
-: ABNORMAL
AMORPHOUS: ABNORMAL
BACTERIA: ABNORMAL
BILIRUBIN URINE: ABNORMAL
CASTS UA: ABNORMAL /LPF (ref 0–2)
CASTS UA: ABNORMAL /LPF (ref 0–2)
COLOR: ABNORMAL
COMMENT UA: ABNORMAL
CRYSTALS, UA: ABNORMAL /HPF
EPITHELIAL CELLS UA: ABNORMAL /HPF (ref 0–5)
GLUCOSE URINE: NEGATIVE
KETONES, URINE: ABNORMAL
LEUKOCYTE ESTERASE, URINE: ABNORMAL
MUCUS: ABNORMAL
NITRITE, URINE: POSITIVE
OTHER OBSERVATIONS UA: ABNORMAL
PH UA: 5.5 (ref 5–8)
PROTEIN UA: ABNORMAL
RBC UA: ABNORMAL /HPF (ref 0–2)
RENAL EPITHELIAL, UA: ABNORMAL /HPF
SPECIFIC GRAVITY UA: 1.03 (ref 1–1.03)
TRICHOMONAS: ABNORMAL
TURBIDITY: ABNORMAL
URINE HGB: ABNORMAL
UROBILINOGEN, URINE: NORMAL
WBC UA: ABNORMAL /HPF (ref 0–5)
YEAST: ABNORMAL

## 2021-07-23 LAB
CULTURE: ABNORMAL
Lab: ABNORMAL
SPECIMEN DESCRIPTION: ABNORMAL

## 2021-08-25 ENCOUNTER — TELEPHONE (OUTPATIENT)
Dept: FAMILY MEDICINE CLINIC | Age: 46
End: 2021-08-25

## 2021-08-25 NOTE — TELEPHONE ENCOUNTER
PA from [place] called to verify if we would no longer give patient Lyrica. She ran [facility] and stated patient should still have medication but she was there at their office today requesting more Lyrica-Since they are a PCP I discharged patient as transfer of care since she is establishing there.

## 2021-08-26 DIAGNOSIS — Z76.0 ENCOUNTER FOR MEDICATION REFILL: ICD-10-CM

## 2021-08-26 DIAGNOSIS — E11.42 TYPE 2 DIABETES MELLITUS WITH DIABETIC POLYNEUROPATHY, WITHOUT LONG-TERM CURRENT USE OF INSULIN (HCC): ICD-10-CM

## 2021-08-26 RX ORDER — PREGABALIN 150 MG/1
CAPSULE ORAL
Qty: 28 CAPSULE | OUTPATIENT
Start: 2021-08-26

## 2021-09-08 DIAGNOSIS — G47.9 SLEEP DISTURBANCE: ICD-10-CM

## 2021-09-08 DIAGNOSIS — Z76.0 MEDICATION REFILL: ICD-10-CM

## 2021-09-08 RX ORDER — TRAZODONE HYDROCHLORIDE 100 MG/1
100 TABLET ORAL NIGHTLY
Qty: 30 TABLET | Refills: 2 | OUTPATIENT
Start: 2021-09-08

## 2021-09-14 DIAGNOSIS — Z76.0 ENCOUNTER FOR MEDICATION REFILL: ICD-10-CM

## 2021-09-14 DIAGNOSIS — E11.42 TYPE 2 DIABETES MELLITUS WITH DIABETIC POLYNEUROPATHY, WITHOUT LONG-TERM CURRENT USE OF INSULIN (HCC): ICD-10-CM

## 2021-09-14 RX ORDER — PREGABALIN 150 MG/1
CAPSULE ORAL
Qty: 28 CAPSULE | OUTPATIENT
Start: 2021-09-14

## 2021-10-11 DIAGNOSIS — E11.42 TYPE 2 DIABETES MELLITUS WITH DIABETIC POLYNEUROPATHY, WITHOUT LONG-TERM CURRENT USE OF INSULIN (HCC): ICD-10-CM

## 2021-10-11 DIAGNOSIS — Z76.0 ENCOUNTER FOR MEDICATION REFILL: ICD-10-CM

## 2021-10-11 RX ORDER — PREGABALIN 150 MG/1
CAPSULE ORAL
Qty: 28 CAPSULE | OUTPATIENT
Start: 2021-10-11

## 2021-11-05 ENCOUNTER — NURSE TRIAGE (OUTPATIENT)
Dept: OTHER | Facility: CLINIC | Age: 46
End: 2021-11-05
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
## 2021-12-03 ENCOUNTER — OFFICE VISIT (OUTPATIENT)
Dept: FAMILY MEDICINE CLINIC | Age: 46
End: 2021-12-03
Payer: COMMERCIAL

## 2021-12-03 VITALS — SYSTOLIC BLOOD PRESSURE: 100 MMHG | HEART RATE: 85 BPM | DIASTOLIC BLOOD PRESSURE: 69 MMHG

## 2021-12-03 DIAGNOSIS — R41.3 MEMORY IMPAIRMENT: ICD-10-CM

## 2021-12-03 DIAGNOSIS — Z00.00 HEALTH MAINTENANCE EXAMINATION: ICD-10-CM

## 2021-12-03 DIAGNOSIS — Z76.89 ESTABLISHING CARE WITH NEW DOCTOR, ENCOUNTER FOR: ICD-10-CM

## 2021-12-03 DIAGNOSIS — Z12.11 SCREEN FOR COLON CANCER: ICD-10-CM

## 2021-12-03 DIAGNOSIS — E11.42 TYPE 2 DIABETES MELLITUS WITH DIABETIC POLYNEUROPATHY, WITHOUT LONG-TERM CURRENT USE OF INSULIN (HCC): Primary | ICD-10-CM

## 2021-12-03 DIAGNOSIS — E11.40 CHRONIC PAINFUL DIABETIC NEUROPATHY (HCC): ICD-10-CM

## 2021-12-03 DIAGNOSIS — Z76.0 ENCOUNTER FOR MEDICATION REFILL: ICD-10-CM

## 2021-12-03 LAB — HBA1C MFR BLD: 6 %

## 2021-12-03 PROCEDURE — 99203 OFFICE O/P NEW LOW 30 MIN: CPT | Performed by: STUDENT IN AN ORGANIZED HEALTH CARE EDUCATION/TRAINING PROGRAM

## 2021-12-03 PROCEDURE — 83036 HEMOGLOBIN GLYCOSYLATED A1C: CPT | Performed by: STUDENT IN AN ORGANIZED HEALTH CARE EDUCATION/TRAINING PROGRAM

## 2021-12-03 RX ORDER — PREGABALIN 150 MG/1
CAPSULE ORAL
Qty: 28 CAPSULE | Refills: 0 | Status: SHIPPED | OUTPATIENT
Start: 2021-12-03 | End: 2021-12-23 | Stop reason: SDUPTHER

## 2021-12-03 ASSESSMENT — ENCOUNTER SYMPTOMS
CHEST TIGHTNESS: 0
COLOR CHANGE: 0
CONSTIPATION: 0
NAUSEA: 0
COUGH: 0
WHEEZING: 0
ABDOMINAL PAIN: 0
SHORTNESS OF BREATH: 0
DIARRHEA: 0

## 2021-12-03 NOTE — PROGRESS NOTES
Subjective:    Steve Rebolledo is a 48 y.o. female with  has a past medical history of Asthma, Bipolar disorder ([address] Depression, Diabetes mellitus ([address] GERD (gastroesophageal reflux disease), and Tobacco abuse. Family History   Problem Relation Age of Onset   [facility] Cancer Mother         pancreatic cancer    Diabetes Mother     Hypertension Mother     Emphysema Father     Cancer Father         stomach       Presented tothe office today for:  Chief Complaint   Patient presents with    New Patient    Diabetes     would like something for neuropathy     Nausea     has been throwing up        HPI Steve Rebolledo is a 49-year-old female with a PMH significant for type 2 diabetes mellitus, mental developmental delay, and bipolar affective disorder. Patient is here today to establish care with this office. Patient also has complaints of diabetic neuropathy and memory impairment. Type 2 diabetes mellitus: Last HbA1c 5.5 on 2021. Repeat HbA1c 6.0 . She currently takes Januvia 50 mg once daily. Reports compliance. Denies any fever, chills, headaches, dizziness, lightheadedness, visual disturbances, chest pain, SOB, palpitations, polyuria, polydipsia, or unintentional weight gain/loss. Memory impairment: Patient is experiencing memory impairment  for the past 1 year. She is having problems with short term memory. Patient states her long term memory is intact. Patient is a poor historian. Diabetic neuropathy: Patient states she was diagnosed with diabetic neuropathy  X 4 years ago. Patient was taking lyrica. Patient states she was dismissed from her other doctor for misusing one of her medications. Patient is unable to recall which medication it is. We will ask patient to fill a medical release form from her previous PCP so we can obtain records.       Occupation: Disability  Previous PCP: unknown    PMHx: Asthma, Type II DM, Chronic diabetic neuropathy    FMHx:   -Mother: ; colon cancer   -Father: ; COPD   -Siblings:    Social Hx:   -Drink: 3-4 beers per day X since age 10   -Smoke: 1/2 pack - 1 PPD X since16   -Drugs: No    Allergies: Lisinopril, metformin, percocet  Medications: Januvia 50 mg once daily      Review of Systems   Constitutional: Negative for activity change, appetite change and fever. HENT: Negative for congestion. Respiratory: Negative for cough, chest tightness, shortness of breath and wheezing. Cardiovascular: Negative for chest pain. Gastrointestinal: Negative for abdominal pain, constipation, diarrhea and nausea. Genitourinary: Negative for difficulty urinating. Musculoskeletal: Negative for joint swelling. Skin: Negative for color change. Neurological: Negative for dizziness, weakness, light-headedness, numbness and headaches. Psychiatric/Behavioral: Negative for agitation and confusion. Objective:    /69   Pulse 85    BP Readings from Last 3 Encounters:   21 100/69   21 112/70   20 110/64     Physical Exam  Constitutional:       General: She is not in acute distress. Appearance: Normal appearance. She is not ill-appearing. HENT:      Head: Normocephalic and atraumatic. Mouth/Throat:      Mouth: Mucous membranes are moist.   Eyes:      Extraocular Movements: Extraocular movements intact. Pupils: Pupils are equal, round, and reactive to light. Cardiovascular:      Rate and Rhythm: Normal rate and regular rhythm. Heart sounds: No murmur heard. No gallop. Pulmonary:      Effort: Pulmonary effort is normal. No respiratory distress. Breath sounds: Normal breath sounds. No wheezing. Abdominal:      General: Bowel sounds are normal. There is no distension. Tenderness: There is no abdominal tenderness. There is no guarding or rebound. Musculoskeletal:         General: No swelling or deformity. Neurological:      General: No focal deficit present.       Mental Status: She Dispense: 28 capsule; Refill: 0    7. Chronic painful diabetic neuropathy (HCC)  -Refill Lyrica 150 mg twice daily for 14 days  - pregabalin (LYRICA) 150 MG capsule; take 1 capsule by mouth twice a day  Dispense: 28 capsule; Refill: 0          Requested Prescriptions     Signed Prescriptions Disp Refills    pregabalin (LYRICA) 150 MG capsule 28 capsule 0     Sig: take 1 capsule by mouth twice a day       Medications Discontinued During This Encounter   Medication Reason    pregabalin (LYRICA) 150 MG capsule REORDER       Return in about 1 month (around 1/3/2022), or F/u 1 month memory impairment. Tishonda received counseling on the following healthy behaviors:   Reviewed prior labs and health maintenance  Continue current medications, diet and exercise. Discussed use, benefit, and side effects of prescribed medications. Barriers to medication compliance addressed. Patient given educational materials - see patient instructions  Was a self-tracking handout given in paper form or via [email or web address]? No:     Requested Prescriptions     Signed Prescriptions Disp Refills    pregabalin (LYRICA) 150 MG capsule 28 capsule 0     Sig: take 1 capsule by mouth twice a day       All patient questions answered. Patient voiced understanding. Quality Measures    There is no height or weight on file to calculate BMI. Normal. Weight control planned discussed Healthy diet and regular exercise. BP: 100/69 Blood pressure is normal. Treatment plan consists of No treatment change needed.     No results found for: LDLCALC, LDLCHOLESTEROL, LDLDIRECT (goal LDL reduction with dx if diabetes is 50% LDL reduction)      PHQ Scores 9/28/2020   PHQ2 Score 0   PHQ9 Score 0     Interpretation of Total Score Depression Severity: 1-4 = Minimal depression, 5-9 = Mild depression, 10-14 = Moderate depression, 15-19 = Moderately severe depression, 20-27 = Severe depression

## 2021-12-06 NOTE — PROGRESS NOTES
Attending Physician Statement  I have discussed the care of Fannin Labs 48 y.o. female, including pertinent history and exam findings, with the resident Dr. Rani Gimenez MD.    History and Exam:   Chief Complaint   Patient presents with    New Patient    Diabetes     would like something for neuropathy     Nausea     has been throwing up        Past Medical History:   Diagnosis Date    Asthma     Bipolar disorder ([facility]     Depression     Diabetes mellitus ([facility]     GERD (gastroesophageal reflux disease)     Tobacco abuse      Allergies   Allergen Reactions    Lisinopril Swelling    Metformin Diarrhea    Percocet [Oxycodone-Acetaminophen]     Percocet [Oxycodone-Acetaminophen] Hives      I have seen and examined the patient and the key elements of the encounter have been performed by me. BP Readings from Last 3 Encounters:   12/03/21 100/69   06/09/21 112/70   09/28/20 110/64     /69   Pulse 85   Lab Results   Component Value Date    WBC 6.9 03/17/2016    HGB 12.6 03/17/2016    HCT 38.7 03/17/2016     03/17/2016    ALT 21 03/17/2016    AST 21 03/17/2016     03/17/2016    K 4.4 03/17/2016    CL 99 03/17/2016    CREATININE 0.80 03/17/2016    BUN 11 03/17/2016    CO2 24 03/17/2016    TSH 0.89 03/16/2016    LABA1C 6.0 12/03/2021     Lab Results   Component Value Date    LABALBU 3.9 03/17/2016     No results found for: IRON, TIBC, FERRITIN  No results found for: LDLCALC, LDLCHOLESTEROL, LDLDIRECT  I agree with the assessment, plan and the diagnosis of    Diagnosis Orders   1. Type 2 diabetes mellitus with diabetic polyneuropathy, without long-term current use of insulin (HCC)  POCT glycosylated hemoglobin (Hb A1C)    LOC - Carmela Sow MD, Ophthalmology, [facility]   2. Memory impairment  Vitamin B12 & Folate    TSH   3. Establishing care with new doctor, encounter for     4. Health maintenance examination  Hepatitis C Antibody    Lipid Panel    HIV Screen   5.  Screen for colon cancer  Alda Hand MD, Gastroenterology, [address]   6. Encounter for medication refill  pregabalin (LYRICA) 150 MG capsule   7. Chronic painful diabetic neuropathy (HCC)  pregabalin (LYRICA) 150 MG capsule    . I agree with orders as documented by the resident. More than 25 minutes spent  in face to face encounter with the patient and more than half in counseling. Patient's questions were answered. Patient Voiced understanding to the counseling. Return in about 1 month (around 1/3/2022), or F/u 1 month memory impairment.    (GC Modifier)-Dr. Yaakov Walters MD

## 2021-12-09 DIAGNOSIS — R11.0 NAUSEA: ICD-10-CM

## 2021-12-09 NOTE — TELEPHONE ENCOUNTER
E-scribe request for med refill. Please review and e-scribe if applicable.      Last Visit Date:  12/03/2021  Next Visit Date:  1/11/2022    Hemoglobin A1C (%)   Date Value   12/03/2021 6.0   06/09/2021 5.5   09/28/2020 5.9             ( goal A1C is < 7)   No results found for: LABMICR  No results found for: LDLCHOLESTEROL, LDLCALC    (goal LDL is <100)   AST (U/L)   Date Value   03/17/2016 21     ALT (U/L)   Date Value   03/17/2016 21     BUN (mg/dL)   Date Value   03/17/2016 11     BP Readings from Last 3 Encounters:   12/03/21 100/69   06/09/21 112/70   09/28/20 110/64          (goal 120/80)        Patient Active Problem List:     Chronic painful diabetic neuropathy (HCC)     Corneal opacity, central     Dyslipidemia     Meibomian gland dysfunction (MGD), bilateral, both upper and lower lids     Mental developmental delay     Sleep disorder with mood complaints     Slow transit constipation     Tobacco dependence     Bilateral foot-drop     Bipolar affective disorder ([facility] [address]      ----Ekaterina Kasper

## 2021-12-10 DIAGNOSIS — E11.40 CHRONIC PAINFUL DIABETIC NEUROPATHY (HCC): ICD-10-CM

## 2021-12-10 DIAGNOSIS — Z76.0 ENCOUNTER FOR MEDICATION REFILL: ICD-10-CM

## 2021-12-10 DIAGNOSIS — G47.9 SLEEP DISTURBANCE: ICD-10-CM

## 2021-12-10 DIAGNOSIS — Z76.0 MEDICATION REFILL: ICD-10-CM

## 2021-12-10 RX ORDER — ONDANSETRON 4 MG/1
4 TABLET, FILM COATED ORAL 3 TIMES DAILY PRN
Qty: 15 TABLET | Refills: 0 | OUTPATIENT
Start: 2021-12-10

## 2021-12-10 NOTE — TELEPHONE ENCOUNTER
----- Message from Eddie Tran sent at 12/10/2021 12:18 PM EST -----  Subject: Message to Provider    QUESTIONS  Information for Provider? Pt would like a call back once her scripts are   sent, she tried to pick the last refills up and someone else had pick them   up.  ---------------------------------------------------------------------------  --------------  CALL BACK INFO  What is the best way for the office to contact you? OK to leave message on   voicemail  Preferred Call Back Phone Number? [phone]  ---------------------------------------------------------------------------  --------------  SCRIPT ANSWERS  Relationship to Patient?  Self

## 2021-12-10 NOTE — TELEPHONE ENCOUNTER
----- Message from Deshawn Bishop sent at 12/10/2021 12:16 PM EST -----  Subject: Refill Request    QUESTIONS  Name of Medication? pregabalin (LYRICA) 150 MG capsule  Patient-reported dosage and instructions? 150mg 2 x a day  How many days do you have left? 0  Preferred Pharmacy? [facility] phone number (if available)? [phone]  Additional Information for Provider? Pt also needs another glucose meter. ---------------------------------------------------------------------------  --------------,  Name of Medication? traZODone (DESYREL) 100 MG tablet  Patient-reported dosage and instructions? 100mg 1 time at night  How many days do you have left? 0  Preferred Pharmacy? [facility] phone number (if available)? [phone]  ---------------------------------------------------------------------------  --------------  CALL BACK INFO  What is the best way for the office to contact you? OK to leave message on   voicemail  Preferred Call Back Phone Number?  [phone]

## 2021-12-12 RX ORDER — TRAZODONE HYDROCHLORIDE 100 MG/1
100 TABLET ORAL NIGHTLY
Qty: 30 TABLET | Refills: 2 | Status: SHIPPED | OUTPATIENT
Start: 2021-12-12 | End: 2022-06-30

## 2021-12-12 RX ORDER — PREGABALIN 150 MG/1
CAPSULE ORAL
Qty: 28 CAPSULE | Refills: 0 | OUTPATIENT
Start: 2021-12-12 | End: 2022-01-06

## 2021-12-20 ENCOUNTER — TELEPHONE (OUTPATIENT)
Dept: GASTROENTEROLOGY | Age: 46
End: 2021-12-20

## 2021-12-20 DIAGNOSIS — Z76.0 ENCOUNTER FOR MEDICATION REFILL: ICD-10-CM

## 2021-12-20 DIAGNOSIS — E11.40 CHRONIC PAINFUL DIABETIC NEUROPATHY (HCC): ICD-10-CM

## 2021-12-20 RX ORDER — PREGABALIN 150 MG/1
CAPSULE ORAL
Qty: 28 CAPSULE | Refills: 0 | OUTPATIENT
Start: 2021-12-20 | End: 2022-01-16

## 2021-12-20 NOTE — TELEPHONE ENCOUNTER
Saleembe Request for pending medication.     Last Visit Date: 12/3/21  Next Visit Date:  Future Appointments   Date Time Provider Eder Fox   1/11/2022  2:00 PM Randalyn Scheuermann,  [facility] Maintenance   Topic Date Due    Hepatitis C screen  Never done    COVID-19 Vaccine (1) Never done    Diabetic foot exam  Never done    Lipid screen  Never done    HIV screen  Never done    Diabetic retinal exam  Never done    Hepatitis B vaccine (1 of 3 - Risk 3-dose series) Never done    Colon cancer screen colonoscopy  Never done    Cervical cancer screen  12/21/2020    Flu vaccine (1) 09/01/2021    Diabetic microalbuminuria test  06/09/2022    A1C test (Diabetic or Prediabetic)  12/03/2022    DTaP/Tdap/Td vaccine (2 - Td or Tdap) 09/28/2030    Pneumococcal 0-64 years Vaccine (2 of 2 - PPSV23) 08/18/2038    Hepatitis A vaccine  Aged Out    Hib vaccine  Aged Out    Meningococcal (ACWY) vaccine  Aged Out       Hemoglobin A1C (%)   Date Value   12/03/2021 6.0   06/09/2021 5.5   09/28/2020 5.9             ( goal A1C is < 7)   No results found for: LABMICR  No results found for: LDLCHOLESTEROL, LDLCALC    (goal LDL is <100)   AST (U/L)   Date Value   03/17/2016 21     ALT (U/L)   Date Value   03/17/2016 21     BUN (mg/dL)   Date Value   03/17/2016 11     BP Readings from Last 3 Encounters:   12/03/21 100/69   06/09/21 112/70   09/28/20 110/64          (goal 120/80)    All Future Testing planned in [facility]  Lab Frequency Next Occurrence   Hepatitis C Antibody Once 12/03/2022   Lipid Panel Once 12/03/2022   HIV Screen Once 12/03/2022   Vitamin B12 & Folate Once 12/03/2022   TSH Once 12/03/2022       Next Visit Date:  Future Appointments   Date Time Provider Eder Fox   1/11/2022  2:00 PM Randalyn Scheuermann, MD [facility]         Patient Active Problem List:     Chronic painful diabetic neuropathy ([address]     Corneal opacity, central     Dyslipidemia     Meibomian gland dysfunction (MGD), bilateral, both upper and lower lids     Mental developmental delay     Sleep disorder with mood complaints     Slow transit constipation     Tobacco dependence     Bilateral foot-drop     Bipolar affective disorder ([facility] [address]

## 2021-12-21 ENCOUNTER — TELEPHONE (OUTPATIENT)
Dept: FAMILY MEDICINE CLINIC | Age: 46
End: 2021-12-21

## 2021-12-22 ENCOUNTER — TELEPHONE (OUTPATIENT)
Dept: FAMILY MEDICINE CLINIC | Age: 46
End: 2021-12-22

## 2021-12-22 NOTE — TELEPHONE ENCOUNTER
Last visit:   Last Med refill:   Does patient have enough medication for 72 hours: No:     Next Visit Date:  Future Appointments   Date Time Provider Eder Oropezai   1/11/2022  2:00 PM Raul Chavira MD [address] [place] [address] Maintenance   Topic Date Due    Hepatitis C screen  Never done    COVID-19 Vaccine (1) Never done    Diabetic foot exam  Never done    Lipid screen  Never done    HIV screen  Never done    Diabetic retinal exam  Never done    Hepatitis B vaccine (1 of 3 - Risk 3-dose series) Never done    Colon cancer screen colonoscopy  Never done    Cervical cancer screen  12/21/2020    Flu vaccine (1) 09/01/2021    Diabetic microalbuminuria test  06/09/2022    A1C test (Diabetic or Prediabetic)  12/03/2022    DTaP/Tdap/Td vaccine (2 - Td or Tdap) 09/28/2030    Pneumococcal 0-64 years Vaccine (2 of 2 - PPSV23) 08/18/2038    Hepatitis A vaccine  Aged Out    Hib vaccine  Aged Out    Meningococcal (ACWY) vaccine  Aged Out       Hemoglobin A1C (%)   Date Value   12/03/2021 6.0   06/09/2021 5.5   09/28/2020 5.9             ( goal A1C is < 7)   No results found for: LABMICR  No results found for: LDLCHOLESTEROL, LDLCALC    (goal LDL is <100)   AST (U/L)   Date Value   03/17/2016 21     ALT (U/L)   Date Value   03/17/2016 21     BUN (mg/dL)   Date Value   03/17/2016 11     BP Readings from Last 3 Encounters:   12/03/21 100/69   06/09/21 112/70   09/28/20 110/64          (goal 120/80)    All Future Testing planned in [facility]  Lab Frequency Next Occurrence   Hepatitis C Antibody Once 12/03/2022   Lipid Panel Once 12/03/2022   HIV Screen Once 12/03/2022   Vitamin B12 & Folate Once 12/03/2022   TSH Once 12/03/2022               Patient Active Problem List:     Chronic painful diabetic neuropathy ([address]     Corneal opacity, central     Dyslipidemia     Meibomian gland dysfunction (MGD), bilateral, both upper and lower lids     Mental developmental delay     Sleep disorder with mood complaints Slow transit constipation     Tobacco dependence     Bilateral foot-drop     Bipolar affective disorder ([facility] [address]           Please address the medication refill and close the encounter. If I can be of assistance, please route to the applicable pool. Thank you.

## 2021-12-23 DIAGNOSIS — R11.0 NAUSEA: ICD-10-CM

## 2021-12-23 DIAGNOSIS — Z76.0 ENCOUNTER FOR MEDICATION REFILL: ICD-10-CM

## 2021-12-23 DIAGNOSIS — E11.42 TYPE 2 DIABETES MELLITUS WITH DIABETIC POLYNEUROPATHY, WITHOUT LONG-TERM CURRENT USE OF INSULIN (HCC): ICD-10-CM

## 2021-12-23 DIAGNOSIS — E11.40 CHRONIC PAINFUL DIABETIC NEUROPATHY (HCC): ICD-10-CM

## 2021-12-23 RX ORDER — PREGABALIN 150 MG/1
CAPSULE ORAL
Qty: 28 CAPSULE | Refills: 0 | Status: SHIPPED | OUTPATIENT
Start: 2021-12-23 | End: 2022-06-30 | Stop reason: SDUPTHER

## 2021-12-23 RX ORDER — ONDANSETRON 4 MG/1
4 TABLET, FILM COATED ORAL 3 TIMES DAILY PRN
Qty: 15 TABLET | Refills: 0 | Status: SHIPPED | OUTPATIENT
Start: 2021-12-23 | End: 2022-06-30 | Stop reason: SDUPTHER

## 2021-12-23 NOTE — TELEPHONE ENCOUNTER
Fabian Request for pending medications.     Last Visit Date: 12/3/21  Next Visit Date:  Future Appointments   Date Time Provider Eder Fox   1/11/2022  2:00 PM Jian Hernandez MD [facility] Maintenance   Topic Date Due    Hepatitis C screen  Never done    COVID-19 Vaccine (1) Never done    Diabetic foot exam  Never done    Lipid screen  Never done    HIV screen  Never done    Diabetic retinal exam  Never done    Hepatitis B vaccine (1 of 3 - Risk 3-dose series) Never done    Colon cancer screen colonoscopy  Never done    Cervical cancer screen  12/21/2020    Flu vaccine (1) 09/01/2021    Diabetic microalbuminuria test  06/09/2022    A1C test (Diabetic or Prediabetic)  12/03/2022    DTaP/Tdap/Td vaccine (2 - Td or Tdap) 09/28/2030    Pneumococcal 0-64 years Vaccine (2 of 2 - PPSV23) 08/18/2038    Hepatitis A vaccine  Aged Out    Hib vaccine  Aged Out    Meningococcal (ACWY) vaccine  Aged Out       Hemoglobin A1C (%)   Date Value   12/03/2021 6.0   06/09/2021 5.5   09/28/2020 5.9             ( goal A1C is < 7)   No results found for: LABMICR  No results found for: LDLCHOLESTEROL, LDLCALC    (goal LDL is <100)   AST (U/L)   Date Value   03/17/2016 21     ALT (U/L)   Date Value   03/17/2016 21     BUN (mg/dL)   Date Value   03/17/2016 11     BP Readings from Last 3 Encounters:   12/03/21 100/69   06/09/21 112/70   09/28/20 110/64          (goal 120/80)    All Future Testing planned in [facility]  Lab Frequency Next Occurrence   Hepatitis C Antibody Once 12/03/2022   Lipid Panel Once 12/03/2022   HIV Screen Once 12/03/2022   Vitamin B12 & Folate Once 12/03/2022   TSH Once 12/03/2022       Next Visit Date:  Future Appointments   Date Time Provider Eder Fox   1/11/2022  2:00 PM Jian Hernandez MD [facility]         Patient Active Problem List:     Chronic painful diabetic neuropathy ([address]     Corneal opacity, central     Dyslipidemia     Meibomian gland dysfunction (MGD), bilateral, both upper and lower lids     Mental developmental delay     Sleep disorder with mood complaints     Slow transit constipation     Tobacco dependence     Bilateral foot-drop     Bipolar affective disorder ([facility] [address]

## 2021-12-23 NOTE — TELEPHONE ENCOUNTER
----- Message from Sophie Rueda sent at 12/22/2021  1:26 PM EST -----  Subject: Refill Request    QUESTIONS  Name of Medication? pregabalin (LYRICA) 150 MG capsule  Patient-reported dosage and instructions? 2 times a day   How many days do you have left? 0  Preferred Pharmacy? [facility] phone number (if available)? [phone]  ---------------------------------------------------------------------------  --------------,  Name of Medication? SITagliptin (JANUVIA) 50 MG tablet  Patient-reported dosage and instructions? 1 a day   How many days do you have left? 0  Preferred Pharmacy? [facility] phone number (if available)? [phone]  ---------------------------------------------------------------------------  --------------,  Name of Medication? ondansetron (ZOFRAN) 4 MG tablet  Patient-reported dosage and instructions? as needed   How many days do you have left? 0  Preferred Pharmacy? [facility] phone number (if available)? [phone]  ---------------------------------------------------------------------------  --------------  CALL BACK INFO  What is the best way for the office to contact you? OK to leave message on   voicemail  Preferred Call Back Phone Number?  [phone]

## 2022-06-22 DIAGNOSIS — E11.40 CHRONIC PAINFUL DIABETIC NEUROPATHY (HCC): ICD-10-CM

## 2022-06-22 DIAGNOSIS — Z76.0 ENCOUNTER FOR MEDICATION REFILL: ICD-10-CM

## 2022-06-22 RX ORDER — PREGABALIN 150 MG/1
CAPSULE ORAL
Qty: 28 CAPSULE | OUTPATIENT
Start: 2022-06-22

## 2022-06-22 NOTE — TELEPHONE ENCOUNTER
Writer attempted to contact patient to schedule appointment. Phone number is disconnected. Removed from patient chart. Please address the medication refill and close the encounter. If I can be of assistance, please route to the applicable pool. Thank you. Last visit: 12-3-2021  Last Med refill: 12-  Does patient have enough medication for 72 hours: No:     Next Visit Date:  No future appointments.     Health Maintenance   Topic Date Due    COVID-19 Vaccine (1) Never done    Diabetic foot exam  Never done    Lipids  Never done    HIV screen  Never done    Diabetic retinal exam  Never done    Hepatitis C screen  Never done    Hepatitis B vaccine (1 of 3 - Risk 3-dose series) Never done    Colorectal Cancer Screen  Never done    Pneumococcal 0-64 years Vaccine (2 - PCV) 07/19/2019    Cervical cancer screen  12/21/2020    Depression Monitoring  09/28/2021    Diabetic microalbuminuria test  06/09/2022    Flu vaccine (Season Ended) 09/01/2022    A1C test (Diabetic or Prediabetic)  12/03/2022    DTaP/Tdap/Td vaccine (2 - Td or Tdap) 09/28/2030    Hepatitis A vaccine  Aged Out    Hib vaccine  Aged Out    Meningococcal (ACWY) vaccine  Aged Out       Hemoglobin A1C (%)   Date Value   12/03/2021 6.0   06/09/2021 5.5   09/28/2020 5.9             ( goal A1C is < 7)   No results found for: LABMICR  No results found for: LDLCHOLESTEROL, LDLCALC    (goal LDL is <100)   AST (U/L)   Date Value   03/17/2016 21     ALT (U/L)   Date Value   03/17/2016 21     BUN (mg/dL)   Date Value   03/17/2016 11     BP Readings from Last 3 Encounters:   12/03/21 100/69   06/09/21 112/70   09/28/20 110/64          (goal 120/80)    All Future Testing planned in [facility]  Lab Frequency Next Occurrence   Hepatitis C Antibody Once 12/03/2022   Lipid Panel Once 12/03/2022   HIV Screen Once 12/03/2022   Vitamin B12 & Folate Once 12/03/2022   TSH Once 12/03/2022               Patient Active Problem List:     Chronic painful diabetic neuropathy (HCC)     Corneal opacity, central     Dyslipidemia     Meibomian gland dysfunction (MGD), bilateral, both upper and lower lids     Mental developmental delay     Sleep disorder with mood complaints     Slow transit constipation     Tobacco dependence     Bilateral foot-drop     Bipolar affective disorder ([facility] [address]

## 2022-06-30 ENCOUNTER — OFFICE VISIT (OUTPATIENT)
Dept: FAMILY MEDICINE CLINIC | Age: 47
End: 2022-06-30
Payer: MEDICARE

## 2022-06-30 VITALS
WEIGHT: 140.6 LBS | DIASTOLIC BLOOD PRESSURE: 63 MMHG | BODY MASS INDEX: 20.83 KG/M2 | HEIGHT: 69 IN | HEART RATE: 92 BPM | TEMPERATURE: 97.2 F | SYSTOLIC BLOOD PRESSURE: 98 MMHG

## 2022-06-30 DIAGNOSIS — E11.9 TYPE 2 DIABETES MELLITUS WITHOUT COMPLICATION, WITHOUT LONG-TERM CURRENT USE OF INSULIN (HCC): Primary | ICD-10-CM

## 2022-06-30 DIAGNOSIS — Z23 NEED FOR HEPATITIS B VACCINATION: ICD-10-CM

## 2022-06-30 DIAGNOSIS — Z76.0 ENCOUNTER FOR MEDICATION REFILL: ICD-10-CM

## 2022-06-30 DIAGNOSIS — Z13.220 SCREENING FOR HYPERLIPIDEMIA: ICD-10-CM

## 2022-06-30 DIAGNOSIS — Z12.11 COLON CANCER SCREENING: ICD-10-CM

## 2022-06-30 DIAGNOSIS — G60.9 IDIOPATHIC PERIPHERAL NEUROPATHY: ICD-10-CM

## 2022-06-30 DIAGNOSIS — Z23 NEED FOR PROPHYLACTIC VACCINATION AGAINST STREPTOCOCCUS PNEUMONIAE (PNEUMOCOCCUS): ICD-10-CM

## 2022-06-30 DIAGNOSIS — Z12.31 SCREENING MAMMOGRAM FOR BREAST CANCER: ICD-10-CM

## 2022-06-30 DIAGNOSIS — R11.0 NAUSEA: ICD-10-CM

## 2022-06-30 PROBLEM — E11.42 TYPE 2 DIABETES MELLITUS WITH DIABETIC POLYNEUROPATHY, WITHOUT LONG-TERM CURRENT USE OF INSULIN (HCC): Status: ACTIVE | Noted: 2022-06-30

## 2022-06-30 LAB — HBA1C MFR BLD: 5.9 %

## 2022-06-30 PROCEDURE — 99213 OFFICE O/P EST LOW 20 MIN: CPT | Performed by: STUDENT IN AN ORGANIZED HEALTH CARE EDUCATION/TRAINING PROGRAM

## 2022-06-30 PROCEDURE — 3044F HG A1C LEVEL LT 7.0%: CPT | Performed by: STUDENT IN AN ORGANIZED HEALTH CARE EDUCATION/TRAINING PROGRAM

## 2022-06-30 PROCEDURE — 90732 PPSV23 VACC 2 YRS+ SUBQ/IM: CPT | Performed by: STUDENT IN AN ORGANIZED HEALTH CARE EDUCATION/TRAINING PROGRAM

## 2022-06-30 PROCEDURE — 90746 HEPB VACCINE 3 DOSE ADULT IM: CPT | Performed by: STUDENT IN AN ORGANIZED HEALTH CARE EDUCATION/TRAINING PROGRAM

## 2022-06-30 PROCEDURE — 83036 HEMOGLOBIN GLYCOSYLATED A1C: CPT | Performed by: STUDENT IN AN ORGANIZED HEALTH CARE EDUCATION/TRAINING PROGRAM

## 2022-06-30 PROCEDURE — 99211 OFF/OP EST MAY X REQ PHY/QHP: CPT | Performed by: FAMILY MEDICINE

## 2022-06-30 RX ORDER — PREGABALIN 150 MG/1
CAPSULE ORAL
Qty: 28 CAPSULE | Refills: 0 | Status: SHIPPED | OUTPATIENT
Start: 2022-06-30 | End: 2022-07-07

## 2022-06-30 RX ORDER — ONDANSETRON 4 MG/1
4 TABLET, FILM COATED ORAL 3 TIMES DAILY PRN
Qty: 15 TABLET | Refills: 0 | Status: SHIPPED | OUTPATIENT
Start: 2022-06-30 | End: 2022-07-07

## 2022-06-30 SDOH — ECONOMIC STABILITY: FOOD INSECURITY: WITHIN THE PAST 12 MONTHS, YOU WORRIED THAT YOUR FOOD WOULD RUN OUT BEFORE YOU GOT MONEY TO BUY MORE.: OFTEN TRUE

## 2022-06-30 SDOH — ECONOMIC STABILITY: FOOD INSECURITY: WITHIN THE PAST 12 MONTHS, THE FOOD YOU BOUGHT JUST DIDN'T LAST AND YOU DIDN'T HAVE MONEY TO GET MORE.: OFTEN TRUE

## 2022-06-30 ASSESSMENT — PATIENT HEALTH QUESTIONNAIRE - PHQ9
2. FEELING DOWN, DEPRESSED OR HOPELESS: 3
4. FEELING TIRED OR HAVING LITTLE ENERGY: 3
9. THOUGHTS THAT YOU WOULD BE BETTER OFF DEAD, OR OF HURTING YOURSELF: 0
10. IF YOU CHECKED OFF ANY PROBLEMS, HOW DIFFICULT HAVE THESE PROBLEMS MADE IT FOR YOU TO DO YOUR WORK, TAKE CARE OF THINGS AT HOME, OR GET ALONG WITH OTHER PEOPLE: 2
7. TROUBLE CONCENTRATING ON THINGS, SUCH AS READING THE NEWSPAPER OR WATCHING TELEVISION: 0
5. POOR APPETITE OR OVEREATING: 3
1. LITTLE INTEREST OR PLEASURE IN DOING THINGS: 3
6. FEELING BAD ABOUT YOURSELF - OR THAT YOU ARE A FAILURE OR HAVE LET YOURSELF OR YOUR FAMILY DOWN: 1
3. TROUBLE FALLING OR STAYING ASLEEP: 3
SUM OF ALL RESPONSES TO PHQ9 QUESTIONS 1 & 2: 6
SUM OF ALL RESPONSES TO PHQ QUESTIONS 1-9: 16
SUM OF ALL RESPONSES TO PHQ QUESTIONS 1-9: 16
8. MOVING OR SPEAKING SO SLOWLY THAT OTHER PEOPLE COULD HAVE NOTICED. OR THE OPPOSITE, BEING SO FIGETY OR RESTLESS THAT YOU HAVE BEEN MOVING AROUND A LOT MORE THAN USUAL: 0
SUM OF ALL RESPONSES TO PHQ QUESTIONS 1-9: 16
SUM OF ALL RESPONSES TO PHQ QUESTIONS 1-9: 16

## 2022-06-30 ASSESSMENT — SOCIAL DETERMINANTS OF HEALTH (SDOH): HOW HARD IS IT FOR YOU TO PAY FOR THE VERY BASICS LIKE FOOD, HOUSING, MEDICAL CARE, AND HEATING?: HARD

## 2022-06-30 NOTE — PROGRESS NOTES
[facility] - Outpatient Note      Subjective:      Mary Kay Hull is a 48 y.o. female  presented to the office on 06/30/22 with complaints of: Diabetes follow-up and med refills    Her diabetes is well controlled. She is allergic to metformin, was prescribed sitagliptin. She reports she is not taking this medication for a while now. Her A1c today in the office is 5.9. She reports she has occasional episodes of hypoglycemia in the past when she was on that medication. She does not check her home glucose. She denies blurry vision. She does have peripheral neuropathy. On chart review, looks like she was always prediabetic. She also complains of occasional nausea and vomiting but denies abdominal pain, no relation to food, constipation or diarrhea. Review of systems  CONSTITUTIONAL: Negative  RESPIRATORY: Negative  CARDIOVASCULAR: Negative  GASTROINTESTINAL: Nausea and vomiting  GENITOURINARY: Negative   ENDOCRINE: Negative  MUSCULOSKELETAL: Negative  NEUROLOGICAL: Sleep problems, peripheral numbness in lower extremities  BEHAVIOR/PSYCH: Negative      Objective:      Vitals:    06/30/22 1309   BP: 98/63   Pulse: 92   Temp: 97.2 °F (36.2 °C)        General Appearance - Alert and oriented x 3   HEENT - No obvious deformity   Lungs - Bilateral good air entry , no wheezes or rales   Cardiovascular - Regular rate and rhythm. No murmur   Abdomen - Soft and nontender   Neurologic -numbness and tingling in bilateral feet up to knees   Skin - No bruising or bleeding on exposed skin area   MSK - No new joint/bone pains    Psych - normal affect       Assessment and Plan:    1. She has prediabetes. At this point, will discontinue sitagliptin and watch her off medications with diet control. Instructions provided. We will check BMP once today to monitor renal function.   2. Follow-up with [facility] screening colonoscopy for family history of colon cancer in mom. Breast cancer screening with mammogram.  3. We will give her 1 month supply of pregabalin for peripheral neuropathy. Check TSH, B12 levels  4. Immunization shots as below. 5. Follow-up in 3 months for prediabetes. ICD-10-CM    1. Type 2 diabetes mellitus without complication, without long-term current use of insulin (HCC)  J25.0 Basic Metabolic Panel   2. Screening for hyperlipidemia  Z13.220 Lipid Panel   3. Need for prophylactic vaccination against Streptococcus pneumoniae (pneumococcus)  Z23 Pneumococcal polysaccharide vaccine 23-valent PPSV23   4. Encounter for medication refill  Z76.0 pregabalin (LYRICA) 150 MG capsule   5. Idiopathic peripheral neuropathy  G60.9 TSH With Reflex Ft4     Vitamin B12 & Folate     pregabalin (LYRICA) 150 MG capsule   6. Colon cancer screening  Z12.11 [facility] Screening Colonoscopy   7. Screening mammogram for breast cancer  Z12.31 [facility] DIGITAL SCREEN W OR WO CAD BILATERAL   8. Nausea  R11.0 ondansetron (ZOFRAN) 4 MG tablet       Return in about 3 months (around 9/30/2022). Requested Prescriptions     Signed Prescriptions Disp Refills    pregabalin (LYRICA) 150 MG capsule 28 capsule 0     Sig: take 1 capsule by mouth twice a day    ondansetron (ZOFRAN) 4 MG tablet 15 tablet 0     Sig: Take 1 tablet by mouth 3 times daily as needed for Nausea or Vomiting       Medications Discontinued During This Encounter   Medication Reason    SITagliptin (JANUVIA) 50 MG tablet Therapy completed    pregabalin (LYRICA) 150 MG capsule REORDER    traZODone (DESYREL) 100 MG tablet LIST CLEANUP    ondansetron (ZOFRAN) 4 MG tablet REORDER       Tishonda received counseling on the following healthy behaviors: nutrition, exercise and medication adherence    Discussed use, benefit, and side effects of prescribed medications. Barriers to medication compliance addressed. All patient questions answered. Pt voiced understanding.        Disclaimer: Some orall of this note was transcribed using voice-recognition software. This may cause typographical errors occasionally. Although all effort is made to fix these errors, please do not hesitate to contact our office if there [place] concern with the understanding of this note.     Ольга Infante MD  Family Medicine PGY-2  06/30/22 at 2:03 PM

## 2022-06-30 NOTE — PROGRESS NOTES
Diabetic visit information    BP Readings from Last 3 Encounters:   22 98/63   21 100/69   21 112/70       Hemoglobin A1C (%)   Date Value   2021 6.0   2021 5.5   2020 5.9               Have you changed or started any medications since your last visit including any over-the-counter medicines, vitamins, or herbal medicines? no   Have you stopped taking any of your medications? Is so, why? -  yes - see list  Are you having any side effects from any of your medications? - no    Have you seen any other physician or provider since your last visit?  no   Have you had any other diagnostic tests since your last visit?  no   Have you been seen in the emergency room and/or had an admission in a hospital since we last saw you?  no     Have you had your annual diabetic retinal (eye) exam? No   (ensure copy of exam is in the chart)    Have you had your routine dental cleaning in the past 6 months? no    Do you have an active [facility] account? If not, what are your barriers? No: code      Patient Care Team:  Ekta Spear MD as PCP - General (Emergency Medicine)    Medical history Review  Past Medical, Family, and Social History reviewed and does not contribute to the patient presenting condition.     Health Maintenance   Topic Date Due    COVID-19 Vaccine (1) Never done    Diabetic foot exam  Never done    Lipids  Never done    HIV screen  Never done    Diabetic retinal exam  Never done    Hepatitis B vaccine (1 of 3 - Risk 3-dose series) Never done    Colorectal Cancer Screen  Never done    Pneumococcal 0-64 years Vaccine (2 - PCV) 2019    Cervical cancer screen  2020    Depression Monitoring  2021    Flu vaccine (Season Ended) 2022    A1C test (Diabetic or Prediabetic)  2023    Diabetic microalbuminuria test  2023    DTaP/Tdap/Td vaccine (2 - Td or Tdap) 2030    Hepatitis C screen  Completed    Hepatitis A vaccine  Aged C/ Jimmie Hernandez 19 Hib vaccine  Aged Out    Meningococcal (ACWY) vaccine  Aged Out

## 2022-06-30 NOTE — PATIENT INSTRUCTIONS
Thank you for letting us take care of you today. We hope all your questions were addressed. If a question was overlooked or something else comes to mind after you return home, please contact a member of your Care Team listed below. Your Care Team at [place] [address] is Team #3  Isaak Harrington MD (Faculty)  Lula Mcclain MD (Faculty  [place] Ochoa MD (Resident)  Aniyah Chery (Resident)   Nancie Aponte MD (Resident)  Placido Goldmann, MD (Resident)  Mikki Lezama., RMA  Sagar Cramer., RMA  Esperanza Franks., JIEN  Rut Marrero., Phyllis Pyle., Francia Fleming ([facility])  Acosta Schmitt, [facility] (Clinical Practice Manager)  Tawny Rivera [facility] (Clinical Pharmacist)     Office phone number: [phone]    If you need to get in right away due to illness, please be advised we have \"Same Day\" appointments available Monday-Friday. Please call us at [phone] option #3 to schedule your \"Same Day\" appointment. Patient Education        Pneumococcal Polysaccharide Vaccine: What You Need to Know  Why get vaccinated? Pneumococcal polysaccharide vaccine (PPSV23) can prevent pneumococcal disease. Pneumococcal disease refers to any illness caused by pneumococcal bacteria. These bacteria can cause many types of illnesses, including pneumonia, which is an infection ofthe lungs. Pneumococcal bacteria are one of the most common causes of pneumonia. Besides pneumonia, pneumococcal bacteria can also cause:   Ear infections,   Sinus infections   Meningitis (infection of the tissue covering the brain and spinal cord)   Bacteremia (bloodstream infection)  Anyone can get pneumococcal disease, but children under 3years of age, people with certain medical conditions, adults 72 years or older, and cigarettesmokers are at the highest risk. Most pneumococcal infections are mild. However, some can result in long-term problems, such as brain damage or hearing loss.  Meningitis, bacteremia, andpneumonia caused by pneumococcal disease can be fatal.  PPSV23  PPSV23 protects against 23 types of bacteria that cause pneumococcal disease. PPSV23 is recommended for:   All adults 72 years or older,   Anyone 2 years or older with certain medical conditions that can lead to an increased risk for pneumococcal disease. Most people need only one dose of PPSV23. A second dose of PPSV23, and another type of pneumococcal vaccine called PCV13, are recommended for certainhigh-risk groups. Your health care provider can give you more information. People 65 years or older should get a dose of PPSV23 even if they have alreadygotten one or more doses of the vaccine before they turned 65. Talk with your health care provider  Tell your vaccine provider if the person getting the vaccine:   Has had an allergic reaction after a previous dose of PPSV23, or has any severe, life-threatening allergies. In some cases, your health care provider may decide to postpone [id number] to a future visit. People with minor illnesses, such as a cold, may be vaccinated. People who are moderately or severely ill should usually wait until they recover beforegetting PPSV23. Your health care provider can give you more information. Risks of a vaccine reaction   Redness or pain where the shot is given, feeling tired, fever, or muscle aches can happen after PPSV23. People sometimes faint after medical procedures, including vaccination. Tellyour provider if you feel dizzy or have vision changes or ringing in the ears. As with any medicine, there is a very remote chance of a vaccine causing asevere allergic reaction, other serious injury, or death. What if there is a serious problem? An allergic reaction could occur after the vaccinated person leaves the clinic.  If you see signs of a severe allergic reaction (hives, swelling of the face and throat, difficulty breathing, a fast heartbeat, dizziness, or weakness), call 9-1-1 and get the person to the nearest hospital.  For other signs live with someone infected with the hepatitis B virus  [facility] and public safety workers at risk for exposure to blood or body fluids   Residents and staff of facilities for developmentally disabled people  [address] living in halfway or group home   Travelers to regions with increased rates of hepatitis B   People with chronic liver disease, kidney disease on dialysis, HIV infection, infection with hepatitis C, or diabetes  Hepatitis B vaccine may be given as a stand-alone vaccine, or as part of a combination vaccine (a type of vaccine that combines more than one vaccinetogether into one shot). Hepatitis B vaccine may be given at the same time as other vaccines. Talk with your health care provider  Tell your vaccination provider if the person getting the vaccine:   Has had an allergic reaction after a previous dose of hepatitis B vaccine, or has any severe, life-threatening allergies  In some cases, your health care provider may decide to postpone hepatitis Bvaccination until a future visit. Pregnant or breastfeeding people should be vaccinated if they are at risk for getting hepatitis B. Pregnancy or breastfeeding are not reasons to avoidhepatitis B vaccination. People with minor illnesses, such as a cold, may be vaccinated. People who are moderately or severely ill should usually wait until they recover beforegetting hepatitis B vaccine. Your health care provider can give you more information. Risks of a vaccine reaction   Soreness where the shot is given or fever can happen after hepatitis B vaccination. People sometimes faint after medical procedures, including vaccination. Tellyour provider if you feel dizzy or have vision changes or ringing in the ears. As with any medicine, there is a very remote chance of a vaccine causing asevere allergic reaction, other serious injury, or death. What if there is a serious problem? An allergic reaction could occur after the vaccinated person leaves the clinic. If you see signs of a severe allergic reaction (hives, swelling of the face and throat, difficulty breathing, a fast heartbeat, dizziness, or weakness), call 9-1-1 and get the person to the nearest hospital.  For other signs that concern you, call your health care provider. Adverse reactions should be reported to the Vaccine Adverse Event Reporting System (VAERS). Your health care provider will usually file this report, or you can do it yourself. Visit the VAERS website at www.vaers. [facility].gov or call 1-598.528.2573. VAERS is only for reporting reactions, and VAERS staff members do not give medical advice. The National Vaccine Injury Compensation Program  The National Vaccine Injury Compensation Program (VICP) is a federal program that was created to compensate people who may have been injured by certain vaccines. Claims regarding alleged injury or death due to vaccination have a time limit for filing, which may be as short as two years. Visit the VICP website at www.[facility].gov/vaccinecompensation or call 2-860.292.9767 to learn about the program and about filing a claim. How can I learn more?  Ask your health care provider.  Call your local or state health department.  Visit the website of the Food and Drug Administration (FDA) for vaccine package inserts and additional information at www.fda.gov/vaccines-blood-biologics/vaccines.  Contact the Centers for Disease Control and Prevention (CDC):  ? Call 2-633.304.4976 (1-800-CDC-INFO) or  ? Visit CDC's website at www.cdc.gov/vaccines. Vaccine Information Statement  Hepatitis B Vaccine  10/15/2021  42 U. S.C. § 300aa-26  U. S. Department of Health and Human Services  Centers for Disease Control and Prevention  Many vaccine information statements are available in [place] and other languages. See www.immunize.org/vis  Hojas de información sobre vacunas están disponibles en español y en muchos otros idiomas.  Visite www.immunize.org/vis  Care instructions adapted under license by [facility] ([facility]). If you have questions about a medical condition or this instruction, always ask your healthcare professional. [address] any warranty or liability for your use of this information.

## 2022-07-06 DIAGNOSIS — Z76.0 ENCOUNTER FOR MEDICATION REFILL: ICD-10-CM

## 2022-07-06 DIAGNOSIS — G60.9 IDIOPATHIC PERIPHERAL NEUROPATHY: ICD-10-CM

## 2022-07-06 DIAGNOSIS — R11.0 NAUSEA: ICD-10-CM

## 2022-07-06 NOTE — TELEPHONE ENCOUNTER
Last visit: 6/30/22  Last Med refill: 6/30/22  Does patient have enough medication for 72 hours: No:     Next Visit Date:  Future Appointments   Date Time Provider Eder Fox   7/28/2022  2:00 PM NURSE, Josephine Reyes Maintenance   Topic Date Due    COVID-19 Vaccine (1) Never done    Diabetic foot exam  Never done    Lipids  Never done    HIV screen  Never done    Diabetic retinal exam  Never done    Colorectal Cancer Screen  Never done    Cervical cancer screen  12/21/2020    Hepatitis B vaccine (2 of 3 - Risk 3-dose series) 07/28/2022    Flu vaccine (1) 09/01/2022    Diabetic microalbuminuria test  05/25/2023    A1C test (Diabetic or Prediabetic)  06/30/2023    Depression Monitoring  06/30/2023    Pneumococcal 0-64 years Vaccine (2 - PCV) 06/30/2023    DTaP/Tdap/Td vaccine (2 - Td or Tdap) 09/28/2030    Hepatitis C screen  Completed    Hepatitis A vaccine  Aged Out    Hib vaccine  Aged Out    Meningococcal (ACWY) vaccine  Aged Out       Hemoglobin A1C (%)   Date Value   06/30/2022 5.9   12/03/2021 6.0   06/09/2021 5.5             ( goal A1C is < 7)   No results found for: LABMICR  No results found for: LDLCHOLESTEROL, LDLCALC    (goal LDL is <100)   AST (U/L)   Date Value   03/17/2016 21     ALT (U/L)   Date Value   03/17/2016 21     BUN (mg/dL)   Date Value   03/17/2016 11     BP Readings from Last 3 Encounters:   06/30/22 98/63   12/03/21 100/69   06/09/21 112/70          (goal 120/80)    All Future Testing planned in [facility]  Lab Frequency Next Occurrence   Hepatitis C Antibody Once 12/03/2022   HIV Screen Once 12/03/2022   TSH Once 12/03/2022   Lipid Panel Once 07/30/2022   RADHA DIGITAL SCREEN W OR WO CAD BILATERAL Once 09/30/2022   TSH With Reflex Ft4 Once 06/30/2022   Vitamin B12 & Folate Once 36/59/0775   Basic Metabolic Panel Once 68/00/8089               Patient Active Problem List:     Chronic painful diabetic neuropathy ([address]     Corneal opacity, central     Dyslipidemia     Meibomian gland dysfunction (MGD), bilateral, both upper and lower lids     Mental developmental delay     Sleep disorder with mood complaints     Slow transit constipation     Tobacco dependence     Bilateral foot-drop     Bipolar affective disorder (HCC)     Screening for hyperlipidemia     Type 2 diabetes mellitus without complication, without long-term current use of insulin ([facility] [address]     Need for prophylactic vaccination against Streptococcus pneumoniae (pneumococcus)     Encounter for medication refill

## 2022-07-07 ENCOUNTER — TELEPHONE (OUTPATIENT)
Dept: GASTROENTEROLOGY | Age: 47
End: 2022-07-07

## 2022-07-07 RX ORDER — PREGABALIN 150 MG/1
CAPSULE ORAL
Qty: 60 CAPSULE | Refills: 0 | Status: SHIPPED | OUTPATIENT
Start: 2022-07-07 | End: 2022-08-07 | Stop reason: SDUPTHER

## 2022-07-07 RX ORDER — ONDANSETRON 4 MG/1
TABLET, FILM COATED ORAL
Qty: 15 TABLET | Refills: 0 | Status: SHIPPED | OUTPATIENT
Start: 2022-07-07 | End: 2022-08-07 | Stop reason: SDUPTHER

## 2022-07-07 RX ORDER — BISACODYL 5 MG
TABLET, DELAYED RELEASE (ENTERIC COATED) ORAL
Qty: 4 TABLET | Refills: 0 | Status: SHIPPED | OUTPATIENT
Start: 2022-07-07

## 2022-07-07 RX ORDER — POLYETHYLENE GLYCOL 3350 17 G/17G
POWDER, FOR SOLUTION ORAL
Qty: 238 G | Refills: 0 | Status: SHIPPED | OUTPATIENT
Start: 2022-07-07

## 2022-07-07 NOTE — TELEPHONE ENCOUNTER
Procedure scheduled/Syed Villalobos  Colonoscopy/Screening  9/2/22   11:30 am   SV    Miralax/dulcolax bowel prep instructions mailed to patient. Patient notified by phone/mail    Colonoscopy questionnaire complete.

## 2022-07-09 DIAGNOSIS — Z76.0 ENCOUNTER FOR MEDICATION REFILL: ICD-10-CM

## 2022-07-09 DIAGNOSIS — G60.9 IDIOPATHIC PERIPHERAL NEUROPATHY: ICD-10-CM

## 2022-07-11 RX ORDER — PREGABALIN 150 MG/1
CAPSULE ORAL
Qty: 28 CAPSULE | OUTPATIENT
Start: 2022-07-11

## 2022-07-11 NOTE — TELEPHONE ENCOUNTER
Once 06/30/2022   Vitamin B12 & Folate Once 74/20/9725   Basic Metabolic Panel Once 76/19/5308               Patient Active Problem List:     Chronic painful diabetic neuropathy ([facility] [address]     Corneal opacity, central     Dyslipidemia     Meibomian gland dysfunction (MGD), bilateral, both upper and lower lids     Mental developmental delay     Sleep disorder with mood complaints     Slow transit constipation     Tobacco dependence     Bilateral foot-drop     Bipolar affective disorder ([facility] [address]     Screening for hyperlipidemia     Type 2 diabetes mellitus without complication, without long-term current use of insulin ([facility] [address]     Need for prophylactic vaccination against Streptococcus pneumoniae (pneumococcus)     Encounter for medication refill

## 2022-07-18 NOTE — PROGRESS NOTES
Attending Physician Statement    Wt Readings from Last 3 Encounters:   06/30/22 140 lb 9.6 oz (63.8 kg)   06/09/21 144 lb (65.3 kg)   09/28/20 145 lb 9.6 oz (66 kg)     Temp Readings from Last 3 Encounters:   06/30/22 97.2 °F (36.2 °C) (Temporal)   09/28/20 98.1 °F (36.7 °C)   03/08/20 98.4 °F (36.9 °C) (Oral)     BP Readings from Last 3 Encounters:   06/30/22 98/63   12/03/21 100/69   06/09/21 112/70     Pulse Readings from Last 3 Encounters:   06/30/22 92   12/03/21 85   06/09/21 100         I have discussed the care of [employer], including pertinent history and exam findings with the resident. I have reviewed the key elements of all parts of the encounter with the resident. I agree with the assessment, plan and orders as documented by the resident.   (GE Modifier)

## 2022-07-30 PROBLEM — Z13.220 SCREENING FOR HYPERLIPIDEMIA: Status: RESOLVED | Noted: 2022-06-30 | Resolved: 2022-07-30

## 2022-08-02 ENCOUNTER — NURSE ONLY (OUTPATIENT)
Dept: FAMILY MEDICINE CLINIC | Age: 47
End: 2022-08-02
Payer: MEDICARE

## 2022-08-02 DIAGNOSIS — Z76.0 ENCOUNTER FOR MEDICATION REFILL: ICD-10-CM

## 2022-08-02 DIAGNOSIS — Z23 NEED FOR HEPATITIS B VACCINATION: Primary | ICD-10-CM

## 2022-08-02 DIAGNOSIS — G60.9 IDIOPATHIC PERIPHERAL NEUROPATHY: ICD-10-CM

## 2022-08-02 DIAGNOSIS — R11.0 NAUSEA: ICD-10-CM

## 2022-08-02 PROCEDURE — 90746 HEPB VACCINE 3 DOSE ADULT IM: CPT

## 2022-08-02 PROCEDURE — 99211 OFF/OP EST MAY X REQ PHY/QHP: CPT

## 2022-08-02 RX ORDER — PREGABALIN 150 MG/1
CAPSULE ORAL
Qty: 60 CAPSULE | Refills: 0 | Status: CANCELLED | OUTPATIENT
Start: 2022-08-02 | End: 2023-08-03

## 2022-08-02 NOTE — TELEPHONE ENCOUNTER
Last visit: 6/30/22  Last Med refill: 6/30/22  Does patient have enough medication for 72 hours: No: .     Next Visit Date:  Future Appointments   Date Time Provider Eder Fox   11/18/2022  1:30 PM Lawrence Aguila MD [address] [address] [facility]   Topic Date Due    COVID-19 Vaccine (1) Never done    Diabetic foot exam  Never done    Lipids  Never done    HIV screen  Never done    Diabetic retinal exam  Never done    Colorectal Cancer Screen  Never done    Cervical cancer screen  12/21/2020    Flu vaccine (1) 09/01/2022    Hepatitis B vaccine (3 of 3 - Risk 3-dose series) 12/02/2022    Diabetic microalbuminuria test  05/25/2023    A1C test (Diabetic or Prediabetic)  06/30/2023    Depression Monitoring  06/30/2023    Pneumococcal 0-64 years Vaccine (2 - PCV) 06/30/2023    DTaP/Tdap/Td vaccine (2 - Td or Tdap) 09/28/2030    Hepatitis C screen  Completed    Hepatitis A vaccine  Aged Out    Hib vaccine  Aged Out    Meningococcal (ACWY) vaccine  Aged Out       Hemoglobin A1C (%)   Date Value   06/30/2022 5.9   12/03/2021 6.0   06/09/2021 5.5             ( goal A1C is < 7)   No results found for: LABMICR  No results found for: LDLCHOLESTEROL, LDLCALC    (goal LDL is <100)   AST (U/L)   Date Value   03/17/2016 21     ALT (U/L)   Date Value   03/17/2016 21     BUN (mg/dL)   Date Value   03/17/2016 11     BP Readings from Last 3 Encounters:   06/30/22 98/63   12/03/21 100/69   06/09/21 112/70          (goal 120/80)    All Future Testing planned in [facility]  Lab Frequency Next Occurrence   Hepatitis C Antibody Once 12/03/2022   HIV Screen Once 12/03/2022   TSH Once 12/03/2022   Lipid Panel Once 07/30/2022   RADHA DIGITAL SCREEN W OR WO CAD BILATERAL Once 09/30/2022   TSH With Reflex Ft4 Once 06/30/2022   Vitamin B12 & Folate Once 08/78/6673   Basic Metabolic Panel Once 65/66/7791               Patient Active Problem List:     Chronic painful diabetic neuropathy ([address]     Corneal opacity, central

## 2022-08-07 RX ORDER — ONDANSETRON 4 MG/1
TABLET, FILM COATED ORAL
Qty: 15 TABLET | Refills: 0 | Status: SHIPPED | OUTPATIENT
Start: 2022-08-07 | End: 2022-09-09 | Stop reason: SDUPTHER

## 2022-08-07 RX ORDER — PREGABALIN 150 MG/1
CAPSULE ORAL
Qty: 60 CAPSULE | Refills: 0 | Status: SHIPPED | OUTPATIENT
Start: 2022-08-07 | End: 2022-09-09 | Stop reason: SDUPTHER

## 2022-08-09 DIAGNOSIS — G60.9 IDIOPATHIC PERIPHERAL NEUROPATHY: ICD-10-CM

## 2022-08-09 DIAGNOSIS — Z76.0 ENCOUNTER FOR MEDICATION REFILL: ICD-10-CM

## 2022-08-09 RX ORDER — PREGABALIN 150 MG/1
CAPSULE ORAL
Qty: 60 CAPSULE | OUTPATIENT
Start: 2022-08-09

## 2022-08-09 NOTE — TELEPHONE ENCOUNTER
Medication filled by Dr. Lida Ramirez 8/7/2022. [facility] to [facility].

## 2022-09-08 DIAGNOSIS — G60.9 IDIOPATHIC PERIPHERAL NEUROPATHY: ICD-10-CM

## 2022-09-08 DIAGNOSIS — R11.0 NAUSEA: ICD-10-CM

## 2022-09-08 DIAGNOSIS — Z76.0 ENCOUNTER FOR MEDICATION REFILL: ICD-10-CM

## 2022-09-08 NOTE — TELEPHONE ENCOUNTER
Patient also said she is going throw menopause, wants to know if there is any medications that can help with menopause symptoms.

## 2022-09-08 NOTE — TELEPHONE ENCOUNTER
E-scribe request for med refills. Please review and e-scribe if applicable.      Last Visit Date:  6/30/22  Next Visit Date:  Visit date not found    Hemoglobin A1C (%)   Date Value   06/30/2022 5.9   12/03/2021 6.0   06/09/2021 5.5             ( goal A1C is < 7)   No results found for: LABMICR  No results found for: LDLCHOLESTEROL, LDLCALC    (goal LDL is <100)   AST (U/L)   Date Value   03/17/2016 21     ALT (U/L)   Date Value   03/17/2016 21     BUN (mg/dL)   Date Value   03/17/2016 11     BP Readings from Last 3 Encounters:   06/30/22 98/63   12/03/21 100/69   06/09/21 112/70          (goal 120/80)        Patient Active Problem List:     Chronic painful diabetic neuropathy (HCC)     Corneal opacity, central     Dyslipidemia     Meibomian gland dysfunction (MGD), bilateral, both upper and lower lids     Mental developmental delay     Sleep disorder with mood complaints     Slow transit constipation     Tobacco dependence     Bilateral foot-drop     Bipolar affective disorder ([address]     Type 2 diabetes mellitus without complication, without long-term current use of insulin ([address]     Need for prophylactic vaccination against Streptococcus pneumoniae (pneumococcus)     Encounter for medication refill      ----Ekaterina Kasper

## 2022-09-09 RX ORDER — PREGABALIN 150 MG/1
CAPSULE ORAL
Qty: 60 CAPSULE | Refills: 0 | Status: SHIPPED | OUTPATIENT
Start: 2022-09-09 | End: 2022-09-13 | Stop reason: SDUPTHER

## 2022-09-09 RX ORDER — ONDANSETRON 4 MG/1
TABLET, FILM COATED ORAL
Qty: 15 TABLET | Refills: 0 | Status: SHIPPED | OUTPATIENT
Start: 2022-09-09

## 2022-09-13 ENCOUNTER — TELEPHONE (OUTPATIENT)
Dept: FAMILY MEDICINE CLINIC | Age: 47
End: 2022-09-13

## 2022-09-13 DIAGNOSIS — Z76.0 ENCOUNTER FOR MEDICATION REFILL: ICD-10-CM

## 2022-09-13 DIAGNOSIS — G60.9 IDIOPATHIC PERIPHERAL NEUROPATHY: ICD-10-CM

## 2022-09-13 RX ORDER — PREGABALIN 150 MG/1
CAPSULE ORAL
Qty: 60 CAPSULE | Refills: 0 | Status: SHIPPED | OUTPATIENT
Start: 2022-09-13 | End: 2023-09-09

## 2022-09-13 NOTE — TELEPHONE ENCOUNTER
Patient called office stating that she was coming to  her script for her Pregabalin and needed to confirm her address. Patient was told to make sure she has a photo ID and she can pick it up. Patient then states she does not have a photo ID. Writer spoke to Juarez [place], and was told patient can bring in a bill with her name and address on it, and she can pick it up. Patient informed.

## 2022-09-13 NOTE — TELEPHONE ENCOUNTER
Rx was not sent to pharmacy, can you please print and sign and we can place in white folder for patient  can .

## 2022-09-13 NOTE — TELEPHONE ENCOUNTER
Writer spoke to patient and informed RX for [employer] is ready for . Up front in white medication folder.

## 2022-10-21 DIAGNOSIS — Z76.0 ENCOUNTER FOR MEDICATION REFILL: ICD-10-CM

## 2022-10-21 DIAGNOSIS — G60.9 IDIOPATHIC PERIPHERAL NEUROPATHY: ICD-10-CM

## 2022-10-21 RX ORDER — PREGABALIN 150 MG/1
CAPSULE ORAL
OUTPATIENT
Start: 2022-10-21 | End: 2023-10-17

## 2022-10-21 NOTE — TELEPHONE ENCOUNTER
Last visit: 6/30/2022  Last Med refill: 9/13/22  Does patient have enough medication for 72 hours: No: patient did not want to schedule an appointment but has not been seen since 6/30/2022.     Next Visit Date:  Future Appointments   Date Time Provider Eder Fox   11/10/2022 11:30 AM STA SCR MAMMO RM 2 STAZ MAMMO STA Radiolog   11/18/2022  1:30 PM Lady Huan MD [facility] [address] Maintenance   Topic Date Due    COVID-19 Vaccine (1) Never done    Diabetic foot exam  Never done    Lipids  Never done    HIV screen  Never done    Diabetic retinal exam  Never done    Colorectal Cancer Screen  Never done    Cervical cancer screen  12/21/2020    Flu vaccine (1) 08/01/2022    Hepatitis B vaccine (3 of 3 - Risk 3-dose series) 12/02/2022    Diabetic microalbuminuria test  05/25/2023    A1C test (Diabetic or Prediabetic)  06/30/2023    Depression Monitoring  06/30/2023    Pneumococcal 0-64 years Vaccine (2 - PCV) 06/30/2023    DTaP/Tdap/Td vaccine (2 - Td or Tdap) 09/28/2030    Hepatitis C screen  Completed    Hepatitis A vaccine  Aged Out    Hib vaccine  Aged Out    Meningococcal (ACWY) vaccine  Aged Out       Hemoglobin A1C (%)   Date Value   06/30/2022 5.9   12/03/2021 6.0   06/09/2021 5.5             ( goal A1C is < 7)   No results found for: LABMICR  No results found for: LDLCHOLESTEROL, LDLCALC    (goal LDL is <100)   AST (U/L)   Date Value   03/17/2016 21     ALT (U/L)   Date Value   03/17/2016 21     BUN (mg/dL)   Date Value   03/17/2016 11     BP Readings from Last 3 Encounters:   06/30/22 98/63   12/03/21 100/69   06/09/21 112/70          (goal 120/80)    All Future Testing planned in [facility]  Lab Frequency Next Occurrence   Hepatitis C Antibody Once 12/03/2022   HIV Screen Once 12/03/2022   TSH Once 12/03/2022   Lipid Panel Once 07/30/2022   ARDHA DIGITAL SCREEN W OR WO CAD BILATERAL Once 09/30/2022   TSH With Reflex Ft4 Once 06/30/2022   Vitamin B12 & Folate Once 82/01/7906   Basic Metabolic Panel Once 06/30/2022               Patient Active Problem List:     Chronic painful diabetic neuropathy ([address]     Corneal opacity, central     Dyslipidemia     Meibomian gland dysfunction (MGD), bilateral, both upper and lower lids     Mental developmental delay     Sleep disorder with mood complaints     Slow transit constipation     Tobacco dependence     Bilateral foot-drop     Bipolar affective disorder ([address]     Type 2 diabetes mellitus without complication, without long-term current use of insulin ([address]     Need for prophylactic vaccination against Streptococcus pneumoniae (pneumococcus)     Encounter for medication refill     Idiopathic peripheral neuropathy

## 2022-10-24 RX ORDER — PREGABALIN 150 MG/1
CAPSULE ORAL
Qty: 60 CAPSULE | Refills: 0 | OUTPATIENT
Start: 2022-10-24 | End: 2023-10-20

## 2022-10-24 NOTE — TELEPHONE ENCOUNTER
Every month when this young lady calls for her refill of Pregabalin she is told the her PCP is not the prescribe and the medication is being refused. When she became a new patient to , pregabalin was prescribed to the patient by him on 12/3/2021. Every time she calls for her refills we have to tell her that is is being refuse because the PCP states he is not the prescriber, but he is. Patient would just like her refill without all the confusion, please advise.

## 2022-10-25 NOTE — TELEPHONE ENCOUNTER
Thank you for checking. Dr. Chandra Jiménez spoke with me regarding his refusal for this medication. I will contact the patient and speak with her.

## 2022-10-26 DIAGNOSIS — G60.9 IDIOPATHIC PERIPHERAL NEUROPATHY: ICD-10-CM

## 2022-10-26 DIAGNOSIS — Z76.0 ENCOUNTER FOR MEDICATION REFILL: ICD-10-CM

## 2022-10-26 RX ORDER — PREGABALIN 150 MG/1
CAPSULE ORAL
Qty: 60 CAPSULE | OUTPATIENT
Start: 2022-10-26

## 2022-10-26 NOTE — TELEPHONE ENCOUNTER
Last visit:   Last Med refill:   Does patient have enough medication for 72 hours: No: patient appt 11/4/22    Next Visit Date:  Future Appointments   Date Time Provider Eder Fox   11/10/2022 11:30 AM STA SCR MAMMO RM 2 STAZ MAMMO STA Radiolog   11/18/2022  1:30 PM Lady Huna MD [facility] [address] Maintenance   Topic Date Due    COVID-19 Vaccine (1) Never done    Diabetic foot exam  Never done    Lipids  Never done    HIV screen  Never done    Diabetic retinal exam  Never done    Colorectal Cancer Screen  Never done    Cervical cancer screen  12/21/2020    Flu vaccine (1) 08/01/2022    Hepatitis B vaccine (3 of 3 - Risk 3-dose series) 12/02/2022    Diabetic microalbuminuria test  05/25/2023    A1C test (Diabetic or Prediabetic)  06/30/2023    Depression Monitoring  06/30/2023    Pneumococcal 0-64 years Vaccine (2 - PCV) 06/30/2023    DTaP/Tdap/Td vaccine (2 - Td or Tdap) 09/28/2030    Hepatitis C screen  Completed    Hepatitis A vaccine  Aged Out    Hib vaccine  Aged Out    Meningococcal (ACWY) vaccine  Aged Out       Hemoglobin A1C (%)   Date Value   06/30/2022 5.9   12/03/2021 6.0   06/09/2021 5.5             ( goal A1C is < 7)   No results found for: LABMICR  No results found for: LDLCHOLESTEROL, LDLCALC    (goal LDL is <100)   AST (U/L)   Date Value   03/17/2016 21     ALT (U/L)   Date Value   03/17/2016 21     BUN (mg/dL)   Date Value   03/17/2016 11     BP Readings from Last 3 Encounters:   06/30/22 98/63   12/03/21 100/69   06/09/21 112/70          (goal 120/80)    All Future Testing planned in [facility]  Lab Frequency Next Occurrence   Hepatitis C Antibody Once 12/03/2022   HIV Screen Once 12/03/2022   TSH Once 12/03/2022   Lipid Panel Once 07/30/2022   RADHA DIGITAL SCREEN W OR WO CAD BILATERAL Once 09/30/2022   TSH With Reflex Ft4 Once 06/30/2022   Vitamin B12 & Folate Once 11/83/0445   Basic Metabolic Panel Once 92/08/9569               Patient Active Problem List:     Chronic painful diabetic neuropathy (HCC)     Corneal opacity, central     Dyslipidemia     Meibomian gland dysfunction (MGD), bilateral, both upper and lower lids     Mental developmental delay     Sleep disorder with mood complaints     Slow transit constipation     Tobacco dependence     Bilateral foot-drop     Bipolar affective disorder ([facility] [address]     Type 2 diabetes mellitus without complication, without long-term current use of insulin ([facility] [address]     Need for prophylactic vaccination against Streptococcus pneumoniae (pneumococcus)     Encounter for medication refill     Idiopathic peripheral neuropathy         Please address the medication refill and close the encounter. If I can be of assistance, please route to the applicable pool. Thank you.

## 2022-10-26 NOTE — TELEPHONE ENCOUNTER
Hello,     Please look at telephone conversation on 7/9/2021. Patient has a history of calling and repeatedly for Lyrica which has been denied multiple times from many different providers. In my opinion, patient does not qualify for any Lyrica refills unless documentation can be provided that shows a legitimate reason for medication. I have been dealing with this back-and-forth message for the past week now. I clearly documented in my note that I will not refill any medications until further evidence or proof is provided. Furthermore, I am not sure why other residents in the clinic are refilling medications without doing a chart review and looking at the previous provider's note. This is why I prefer my patients to come and see me, but instead they are seeing other providers. This causes confusion not only for the staff but for the PCP. I hope this provides further clarity.      Ama Rios

## 2022-10-27 DIAGNOSIS — Z76.0 ENCOUNTER FOR MEDICATION REFILL: ICD-10-CM

## 2022-10-27 DIAGNOSIS — G60.9 IDIOPATHIC PERIPHERAL NEUROPATHY: ICD-10-CM

## 2022-10-27 NOTE — TELEPHONE ENCOUNTER
Lyrica pending for refill     Health Maintenance   Topic Date Due    COVID-19 Vaccine (1) Never done    Diabetic foot exam  Never done    Lipids  Never done    HIV screen  Never done    Diabetic retinal exam  Never done    Colorectal Cancer Screen  Never done    Cervical cancer screen  12/21/2020    Flu vaccine (1) 08/01/2022    Hepatitis B vaccine (3 of 3 - Risk 3-dose series) 12/02/2022    Diabetic microalbuminuria test  05/25/2023    A1C test (Diabetic or Prediabetic)  06/30/2023    Depression Monitoring  06/30/2023    Pneumococcal 0-64 years Vaccine (2 - PCV) 06/30/2023    DTaP/Tdap/Td vaccine (2 - Td or Tdap) 09/28/2030    Hepatitis C screen  Completed    Hepatitis A vaccine  Aged Out    Hib vaccine  Aged Out    Meningococcal (ACWY) vaccine  Aged Out             (applicable per patient's age: Cancer Screenings, Depression Screening, Fall Risk Screening, Immunizations)    Hemoglobin A1C (%)   Date Value   06/30/2022 5.9   12/03/2021 6.0   06/09/2021 5.5     AST (U/L)   Date Value   03/17/2016 21     ALT (U/L)   Date Value   03/17/2016 21     BUN (mg/dL)   Date Value   03/17/2016 11      (goal A1C is < 7)   (goal LDL is <100) need 30-50% reduction from baseline     BP Readings from Last 3 Encounters:   06/30/22 98/63   12/03/21 100/69   06/09/21 112/70    (goal /80)      All Future Testing planned in [facility]:  Lab Frequency Next Occurrence   Hepatitis C Antibody Once 12/03/2022   HIV Screen Once 12/03/2022   TSH Once 12/03/2022   Lipid Panel Once 07/30/2022   RADHA DIGITAL SCREEN W OR WO CAD BILATERAL Once 09/30/2022   TSH With Reflex Ft4 Once 06/30/2022   Vitamin B12 & Folate Once [birth year]1956   Basic Metabolic Panel Once 26/12/7866       Next Visit Date:  Future Appointments   Date Time Provider Eder Fox   11/4/2022  3:00 PM Maria A Tariq MD [id number] [facility]   11/10/2022 11:30 AM STA SCR MAMMO RM 2 STAZ MAMMO STA Radiolog   11/18/2022  1:30 PM Santana Katz MD [facility]            Patient Active Problem List:     Chronic painful diabetic neuropathy (HCC)     Corneal opacity, central     Dyslipidemia     Meibomian gland dysfunction (MGD), bilateral, both upper and lower lids     Mental developmental delay     Sleep disorder with mood complaints     Slow transit constipation     Tobacco dependence     Bilateral foot-drop     Bipolar affective disorder ([facility] [address]     Type 2 diabetes mellitus without complication, without long-term current use of insulin ([facility] [address]     Need for prophylactic vaccination against Streptococcus pneumoniae (pneumococcus)     Encounter for medication refill     Idiopathic peripheral neuropathy

## 2022-10-28 ENCOUNTER — TELEPHONE (OUTPATIENT)
Dept: FAMILY MEDICINE CLINIC | Age: 47
End: 2022-10-28

## 2022-10-28 RX ORDER — PREGABALIN 150 MG/1
CAPSULE ORAL
Qty: 60 CAPSULE | OUTPATIENT
Start: 2022-10-28

## 2022-10-28 NOTE — TELEPHONE ENCOUNTER
Patient called in stating she is in pain and she do not want to go to emergency room patient is requesting her Lyrica 150 mg.

## 2022-10-28 NOTE — TELEPHONE ENCOUNTER
Please see Dr Molina's  message from 10/21/22. Pregabalin will not be refilled until patient has records from previous provider sent to our office. ALLA was sent to Dr. Regina Lee, waiting for records.

## 2022-11-04 DIAGNOSIS — G60.9 IDIOPATHIC PERIPHERAL NEUROPATHY: ICD-10-CM

## 2022-11-04 DIAGNOSIS — Z76.0 ENCOUNTER FOR MEDICATION REFILL: ICD-10-CM

## 2022-11-07 RX ORDER — PREGABALIN 150 MG/1
CAPSULE ORAL
Qty: 28 CAPSULE | OUTPATIENT
Start: 2022-11-07

## 2022-11-17 ENCOUNTER — TELEPHONE (OUTPATIENT)
Dept: FAMILY MEDICINE CLINIC | Age: 47
End: 2022-11-17

## 2022-11-17 NOTE — TELEPHONE ENCOUNTER
The patient medical records from the other office are in her chart and scanned under the media, Writer spoke with [facility] who stated she was put on the  Pregabalin at the hospital.

## 2022-11-17 NOTE — TELEPHONE ENCOUNTER
E-scribe request for med refills. Please review and e-scribe if applicable.      Last Visit Date:  6/30/22  Next Visit Date:  11/18/2022    Hemoglobin A1C (%)   Date Value   06/30/2022 5.9   12/03/2021 6.0   06/09/2021 5.5             ( goal A1C is < 7)   No results found for: LABMICR  No results found for: LDLCHOLESTEROL, LDLCALC    (goal LDL is <100)   AST (U/L)   Date Value   03/17/2016 21     ALT (U/L)   Date Value   03/17/2016 21     BUN (mg/dL)   Date Value   03/17/2016 11     BP Readings from Last 3 Encounters:   06/30/22 98/63   12/03/21 100/69   06/09/21 112/70          (goal 120/80)        Patient Active Problem List:     Chronic painful diabetic neuropathy (HCC)     Corneal opacity, central     Dyslipidemia     Meibomian gland dysfunction (MGD), bilateral, both upper and lower lids     Mental developmental delay     Sleep disorder with mood complaints     Slow transit constipation     Tobacco dependence     Bilateral foot-drop     Bipolar affective disorder ([address]     Type 2 diabetes mellitus without complication, without long-term current use of insulin ([address]     Need for prophylactic vaccination against Streptococcus pneumoniae (pneumococcus)     Encounter for medication refill     Idiopathic peripheral neuropathy      ----Devin Zuniga

## 2022-11-21 DIAGNOSIS — G60.9 IDIOPATHIC PERIPHERAL NEUROPATHY: ICD-10-CM

## 2022-11-21 DIAGNOSIS — Z76.0 ENCOUNTER FOR MEDICATION REFILL: ICD-10-CM

## 2022-11-21 RX ORDER — PREGABALIN 150 MG/1
CAPSULE ORAL
Qty: 60 CAPSULE | OUTPATIENT
Start: 2022-11-21

## 2022-11-25 ENCOUNTER — OFFICE VISIT (OUTPATIENT)
Dept: FAMILY MEDICINE CLINIC | Age: 47
End: 2022-11-25
Payer: MEDICARE

## 2022-11-25 VITALS
DIASTOLIC BLOOD PRESSURE: 71 MMHG | WEIGHT: 141 LBS | HEART RATE: 77 BPM | BODY MASS INDEX: 21.13 KG/M2 | SYSTOLIC BLOOD PRESSURE: 108 MMHG | TEMPERATURE: 98.1 F

## 2022-11-25 DIAGNOSIS — R11.0 NAUSEA: ICD-10-CM

## 2022-11-25 DIAGNOSIS — G60.9 IDIOPATHIC PERIPHERAL NEUROPATHY: ICD-10-CM

## 2022-11-25 DIAGNOSIS — R73.03 PREDIABETES: Primary | ICD-10-CM

## 2022-11-25 PROCEDURE — 99213 OFFICE O/P EST LOW 20 MIN: CPT | Performed by: STUDENT IN AN ORGANIZED HEALTH CARE EDUCATION/TRAINING PROGRAM

## 2022-11-25 PROCEDURE — 90471 IMMUNIZATION ADMIN: CPT | Performed by: STUDENT IN AN ORGANIZED HEALTH CARE EDUCATION/TRAINING PROGRAM

## 2022-11-25 RX ORDER — ONDANSETRON 4 MG/1
4 TABLET, ORALLY DISINTEGRATING ORAL 3 TIMES DAILY PRN
Qty: 21 TABLET | Refills: 0 | Status: SHIPPED | OUTPATIENT
Start: 2022-11-25

## 2022-11-25 RX ORDER — ONDANSETRON 4 MG/1
TABLET, FILM COATED ORAL
Qty: 15 TABLET | Refills: 0 | Status: CANCELLED | OUTPATIENT
Start: 2022-11-25

## 2022-11-25 RX ORDER — PREGABALIN 150 MG/1
CAPSULE ORAL
Qty: 60 CAPSULE | Refills: 0 | Status: CANCELLED | OUTPATIENT
Start: 2022-11-25 | End: 2023-11-21

## 2022-11-25 ASSESSMENT — ENCOUNTER SYMPTOMS
WHEEZING: 0
VOMITING: 0
SHORTNESS OF BREATH: 0
NAUSEA: 1
ABDOMINAL PAIN: 0

## 2022-11-25 NOTE — PATIENT INSTRUCTIONS
Thank you for letting us take care of you today. We hope all your questions were addressed. If a question was overlooked or something else comes to mind after you return home, please contact a member of your Care Team listed below. Your Care Team at [facility] is Team #3  Moon Guerrier MD (Faculty)  La Dhillon MD (Faculty  Speedymercy Guillen MD (Resident)  Celso Howell (Resident)   Annabelle Jerez MD (Resident)  ERIN Sanchez., RAFAEL Gaona., RAFAEL Arias ([address] [facility])  Julien Tamez, [address] [facility] ([address] [facility])    Steffanie Garcia [facility] (Clinical Pharmacist)     Office phone number: [phone]    If you need to get in right away due to illness, please be advised we have \"Same Day\" appointments available Monday-Friday. Please call us at [phone] option #3 to schedule your \"Same Day\" appointment.

## 2022-11-25 NOTE — PROGRESS NOTES
Visit Information    Have you changed or started any medications since your last visit including any over-the-counter medicines, vitamins, or herbal medicines? no   Have you stopped taking any of your medications? Is so, why? -  no  Are you having any side effects from any of your medications? - no    Have you seen any other physician or provider since your last visit?  no   Have you had any other diagnostic tests since your last visit?  no   Have you been seen in the emergency room and/or had an admission in a hospital since we last saw you?  no   Have you had your routine dental cleaning in the past 6 months?  no     Do you have an active MyChart account? If no, what is the barrier?   No:     Patient Care Team:  Zohaib Alfredo MD as PCP - General (Emergency Medicine)    Medical History Review  Past Medical, Family, and Social History reviewed and does not contribute to the patient presenting condition    Health Maintenance   Topic Date Due    COVID-19 Vaccine (1) Never done    Diabetic foot exam  Never done    Lipids  Never done    HIV screen  Never done    Diabetic retinal exam  Never done    Colorectal Cancer Screen  Never done    Cervical cancer screen  12/21/2020    Flu vaccine (1) 08/01/2022    Hepatitis B vaccine (3 of 3 - Risk 3-dose series) 12/02/2022    Diabetic microalbuminuria test  05/25/2023    A1C test (Diabetic or Prediabetic)  06/30/2023    Depression Monitoring  06/30/2023    Pneumococcal 0-64 years Vaccine (2 - PCV) 06/30/2023    DTaP/Tdap/Td vaccine (2 - Td or Tdap) 09/28/2030    Hepatitis C screen  Completed    Hepatitis A vaccine  Aged Out    Hib vaccine  Aged Out    Meningococcal (ACWY) vaccine  Aged Out

## 2022-11-25 NOTE — PROGRESS NOTES
Attending Physician Statement  I have discussed the care of Howard Rubio, including pertinent history and exam findings,  with the resident. I have reviewed the key elements of all parts of the encounter with the resident. I agree with the assessment, plan and orders as documented by the resident.   (Sheri Brown)    Jacques Speaker, MD

## 2022-11-25 NOTE — PROGRESS NOTES
Subjective:    Melvin Perez is a 50 y.o. female with  has a past medical history of Asthma, Bipolar disorder ([address] Depression, Diabetes mellitus ([address] GERD (gastroesophageal reflux disease), and Tobacco abuse. Family History   Problem Relation Age of Onset    Cancer Mother         pancreatic cancer    Diabetes Mother     Hypertension Mother     Emphysema Father     Cancer Father         stomach       Presented tothe office today for:  Chief Complaint   Patient presents with    Pain     Patient is still having pain in hand, foot    Nausea     Patient states she has nausea    Health Maintenance     Patient refused flu shot       HPI    Melvin Perez is a 49-year-old female with a reported PMH significant for type 2 diabetes mellitus and idiopathic peripheral neuropathy. Patient is here today with multiple complaints including hand and foot pain as well as nausea. Idiopathic peripheral neuropathy: Patient was last seen by me back in 12/3/2021 as a new patient. At that time, patient wanted refills on Lyrica. Per chart review, multiple prescribers in the past have denied her for Lyrica as it was not indicated. Since then, patient has repeatedly called for refills of Lyrica. However, there was no proper documentation to support the need of medication. Patient reportedly has a history of diabetic neuropathy. Upon further review, patient's last HbA1c was 5.9 back on 6/30/2022. Patient does not take any medications. Patient has lab work-up which was ordered on 6/30/2022 and has yet to complete them. Patient reportedly was seeing a nurse practitioner who sent over a letter stating that \" Kedar Blankenship has worked well for her in the past.\"  I do not see any evidence or indication for Lyrica at this time. Patient needs further evaluation and testing to determine the need for medication.     Patient states she has been experiencing numbness involving her upper and lower extremities for the past year.  Patient is a poor historian. She reports a remote history of unsteady gait. However, patient is ambulating well. Denies any fever, chills, headaches, dizziness, blurred vision, chest pain, SOB, or palpitation. Patient is also complaining about subjective nausea. Patient states this has been going on for the past 6-7 months. Patient is unable to tell me whether or not it is in relation to food intake. Patient denies any dysphagia, heartburn, or abdominal pain. Patient states that she is currently not drinking. No history of pancreatitis. Denies any fever, chills, headaches, dizziness, abdominal pain, or loose bowel movements. Review of Systems   Constitutional:  Negative for chills, fatigue and fever. Respiratory:  Negative for shortness of breath and wheezing. Cardiovascular:  Negative for chest pain and palpitations. Gastrointestinal:  Positive for nausea. Negative for abdominal pain and vomiting. Musculoskeletal:  Positive for arthralgias. Neurological:  Negative for syncope, weakness, numbness and headaches. Objective:    /71 (Site: Left Upper Arm, Position: Sitting, Cuff Size: Medium Adult)   Pulse 77   Temp 98.1 °F (36.7 °C) (Oral)   Wt 141 lb (64 kg)   LMP 10/12/2022   BMI 21.13 kg/m²    BP Readings from Last 3 Encounters:   11/25/22 108/71   06/30/22 98/63   12/03/21 100/69     Physical Exam  Constitutional:       General: She is not in acute distress. Appearance: Normal appearance. She is not ill-appearing. HENT:      Head: Normocephalic and atraumatic. Eyes:      Extraocular Movements: Extraocular movements intact. Cardiovascular:      Rate and Rhythm: Normal rate and regular rhythm. Pulses: Normal pulses. Pulmonary:      Effort: Pulmonary effort is normal. No respiratory distress. Breath sounds: No wheezing, rhonchi or rales. Abdominal:      General: Bowel sounds are normal. There is no distension. Palpations: Abdomen is soft. Tenderness: There is no abdominal tenderness. There is no guarding. Skin:     General: Skin is warm. Neurological:      Mental Status: She is alert and oriented to person, place, and time. Comments: CN 2-12 grossly intact. Strength 5/5 throughout. Sensation intact   Psychiatric:         Mood and Affect: Mood normal.         Lab Results   Component Value Date    WBC 6.9 03/17/2016    HGB 12.6 03/17/2016    HCT 38.7 03/17/2016     03/17/2016    ALT 21 03/17/2016    AST 21 03/17/2016     03/17/2016    K 4.4 03/17/2016    CL 99 03/17/2016    CREATININE 0.80 03/17/2016    BUN 11 03/17/2016    CO2 24 03/17/2016    TSH 0.89 03/16/2016    LABA1C 5.9 06/30/2022     Lab Results   Component Value Date    CALCIUM 9.2 03/17/2016     No results found for: LDLCALC, LDLCHOLESTEROL, LDLDIRECT    Assessment and Plan:    1. Idiopathic peripheral neuropathy  -Lengthy discussion was held with patient  -Patient needs further work-up before Lyrica can be prescribed  -Patient is in agreement  -We will need to order an EMG and we will refer patient to Dr. Yu Muhammad for further evaluation  -Patient will need an EMG to determine whether or not she has any nerve damage  -I did discuss with patient that if imaging shows nerve damage that we will prescribe her Lyrica  -Patient voiced understanding  -We will also repeat previous lab requisitions so patient can get that completed  - EMG; Future  - Maddy Aanya MD, Physical Medicine and Rehabilitation, [place]    2. Prediabetes  -Patient is pre-diabetic  -No evidence of diabetic neuropathy  -Last A1c 5.9 on 6/30/2022  -Continue lifestyle and diet modifications    3.  Nausea  -Nonspecific  -Differentials include GERD versus gallbladder versus pancreatic etiology  -Patient has deferred further testing at this time and is requesting zofran  -Instructed patient go to ED if experiences new onset/worsening fever, chills, headaches, dizziness, blurred vision, chest pain, SOB, palpitations, abdominal pain, nausea, vomiting, or diarrhea  -Patient voiced understanding and is in agreement  - ondansetron (ZOFRAN-ODT) 4 MG disintegrating tablet; Take 1 tablet by mouth 3 times daily as needed for Nausea or Vomiting  Dispense: 21 tablet; Refill: 0          Requested Prescriptions     Signed Prescriptions Disp Refills    ondansetron (ZOFRAN-ODT) 4 MG disintegrating tablet 21 tablet 0     Sig: Take 1 tablet by mouth 3 times daily as needed for Nausea or Vomiting       There are no discontinued medications. Return in about 3 months (around 2/25/2023). Hetal received counseling on the following healthy behaviors:   Reviewed prior labs and health maintenance  Continue current medications, diet and exercise. Discussed use, benefit, and side effects of prescribed medications. Barriers to medication compliance addressed. Patient given educational materials - see patient instructions  Was a self-tracking handout given in paper form or via [employer]? No:     Requested Prescriptions     Signed Prescriptions Disp Refills    ondansetron (ZOFRAN-ODT) 4 MG disintegrating tablet 21 tablet 0     Sig: Take 1 tablet by mouth 3 times daily as needed for Nausea or Vomiting       All patient questions answered. Patient voiced understanding. Quality Measures    Body mass index is 21.13 kg/m². Normal. Weight control planned discussed Healthy diet and regular exercise. BP: 108/71 Blood pressure is Normal. Treatment plan consists of No treatment change needed.     No results found for: LDLCALC, LDLCHOLESTEROL, LDLDIRECT (goal LDL reduction with dx if diabetes is 50% LDL reduction)      PHQ Scores 6/30/2022 9/28/2020   PHQ2 Score 6 0   PHQ9 Score 16 0     Interpretation of Total Score Depression Severity: 1-4 = Minimal depression, 5-9 = Mild depression, 10-14 = Moderate depression, 15-19 = Moderately severe depression, 20-27 = Severe depression

## 2022-12-07 ENCOUNTER — TELEPHONE (OUTPATIENT)
Dept: GASTROENTEROLOGY | Age: 47
End: 2022-12-07

## 2022-12-07 RX ORDER — POLYETHYLENE GLYCOL 3350 17 G/17G
POWDER, FOR SOLUTION ORAL
Qty: 238 G | Refills: 0 | Status: SHIPPED | OUTPATIENT
Start: 2022-12-07

## 2022-12-07 RX ORDER — BISACODYL 5 MG
TABLET, DELAYED RELEASE (ENTERIC COATED) ORAL
Qty: 4 TABLET | Refills: 0 | Status: SHIPPED | OUTPATIENT
Start: 2022-12-07

## 2022-12-07 NOTE — TELEPHONE ENCOUNTER
Patient scheduled for colonoscopy 1/20/23    Verbal instructions given on phone, written instructions mailed.      Miralax/ dulcolax prep

## 2022-12-14 DIAGNOSIS — G60.9 IDIOPATHIC PERIPHERAL NEUROPATHY: ICD-10-CM

## 2022-12-14 DIAGNOSIS — Z76.0 ENCOUNTER FOR MEDICATION REFILL: ICD-10-CM

## 2022-12-14 RX ORDER — PREGABALIN 150 MG/1
CAPSULE ORAL
Qty: 60 CAPSULE | Refills: 0 | OUTPATIENT
Start: 2022-12-14 | End: 2023-12-10

## 2022-12-14 NOTE — TELEPHONE ENCOUNTER
E-scribe request for med refill. Please review and e-scribe if applicable.      Last Visit Date:  11/25/2022  Next Visit Date:  Visit date not found    Hemoglobin A1C (%)   Date Value   06/30/2022 5.9   12/03/2021 6.0   06/09/2021 5.5             ( goal A1C is < 7)   No results found for: LABMICR  No results found for: LDLCHOLESTEROL, LDLCALC    (goal LDL is <100)   AST (U/L)   Date Value   03/17/2016 21     ALT (U/L)   Date Value   03/17/2016 21     BUN (mg/dL)   Date Value   03/17/2016 11     BP Readings from Last 3 Encounters:   11/25/22 108/71   06/30/22 98/63   12/03/21 100/69          (goal 120/80)        Patient Active Problem List:     Chronic painful diabetic neuropathy (HCC)     Corneal opacity, central     Dyslipidemia     Meibomian gland dysfunction (MGD), bilateral, both upper and lower lids     Mental developmental delay     Sleep disorder with mood complaints     Slow transit constipation     Tobacco dependence     Bilateral foot-drop     Bipolar affective disorder ([address]     Type 2 diabetes mellitus without complication, without long-term current use of insulin ([address]     Need for prophylactic vaccination against Streptococcus pneumoniae (pneumococcus)     Encounter for medication refill     Idiopathic peripheral neuropathy     Nausea     Prediabetes      ----Inez Martin

## 2022-12-15 NOTE — TELEPHONE ENCOUNTER
Patient is aware that this medication is not going to be refilled unless she completes further testing which indicates that she has neuropathy

## 2022-12-23 DIAGNOSIS — Z76.0 ENCOUNTER FOR MEDICATION REFILL: ICD-10-CM

## 2022-12-23 DIAGNOSIS — G60.9 IDIOPATHIC PERIPHERAL NEUROPATHY: ICD-10-CM

## 2022-12-27 RX ORDER — PREGABALIN 150 MG/1
CAPSULE ORAL
Qty: 60 CAPSULE | OUTPATIENT
Start: 2022-12-27

## 2023-02-13 ENCOUNTER — HOSPITAL ENCOUNTER (EMERGENCY)
Age: 48
Discharge: HOME | End: 2023-02-13
Attending: EMERGENCY MEDICINE
Payer: MEDICARE

## 2023-02-13 VITALS
OXYGEN SATURATION: 100 % | DIASTOLIC BLOOD PRESSURE: 81 MMHG | BODY MASS INDEX: 20.98 KG/M2 | WEIGHT: 140 LBS | RESPIRATION RATE: 16 BRPM | TEMPERATURE: 98.1 F | HEART RATE: 78 BPM | SYSTOLIC BLOOD PRESSURE: 120 MMHG

## 2023-02-13 DIAGNOSIS — N30.01 ACUTE CYSTITIS WITH HEMATURIA: Primary | ICD-10-CM

## 2023-02-13 LAB
BACTERIA: ABNORMAL
BILIRUBIN URINE: ABNORMAL
CANDIDA SPECIES, DNA PROBE: NEGATIVE
CASTS UA: ABNORMAL /LPF (ref 0–8)
CHP ED QC CHECK: NORMAL
COLOR: ABNORMAL
EPITHELIAL CELLS UA: ABNORMAL /HPF (ref 0–5)
GARDNERELLA VAGINALIS, DNA PROBE: NEGATIVE
GLUCOSE UR STRIP.AUTO-MCNC: ABNORMAL MG/DL
KETONES UR STRIP.AUTO-MCNC: ABNORMAL MG/DL
LEUKOCYTE ESTERASE UR QL STRIP.AUTO: ABNORMAL
NITRITE UR QL STRIP.AUTO: POSITIVE
PREGNANCY TEST URINE, POC: NEGATIVE
PROT UR STRIP.AUTO-MCNC: 5.5 MG/DL (ref 5–8)
PROT UR STRIP.AUTO-MCNC: ABNORMAL MG/DL
RBC CLUMPS #/AREA URNS AUTO: ABNORMAL /HPF (ref 0–4)
SOURCE: NORMAL
SPECIFIC GRAVITY UA: 1.03 (ref 1–1.03)
TRICHOMONAS VAGINALIS DNA: NEGATIVE
TURBIDITY: ABNORMAL
URINE HGB: ABNORMAL
UROBILINOGEN, URINE: NORMAL
WBC UA: ABNORMAL /HPF (ref 0–5)

## 2023-02-13 PROCEDURE — 87510 GARDNER VAG DNA DIR PROBE: CPT

## 2023-02-13 PROCEDURE — 96372 THER/PROPH/DIAG INJ SC/IM: CPT

## 2023-02-13 PROCEDURE — 81001 URINALYSIS AUTO W/SCOPE: CPT

## 2023-02-13 PROCEDURE — 87591 N.GONORRHOEAE DNA AMP PROB: CPT

## 2023-02-13 PROCEDURE — 87480 CANDIDA DNA DIR PROBE: CPT

## 2023-02-13 PROCEDURE — 99284 EMERGENCY DEPT VISIT MOD MDM: CPT

## 2023-02-13 PROCEDURE — 87660 TRICHOMONAS VAGIN DIR PROBE: CPT

## 2023-02-13 PROCEDURE — 87088 URINE BACTERIA CULTURE: CPT

## 2023-02-13 PROCEDURE — 87491 CHLMYD TRACH DNA AMP PROBE: CPT

## 2023-02-13 PROCEDURE — 87186 SC STD MICRODIL/AGAR DIL: CPT

## 2023-02-13 PROCEDURE — 87086 URINE CULTURE/COLONY COUNT: CPT

## 2023-02-13 PROCEDURE — 6360000002 HC RX W HCPCS: Performed by: EMERGENCY MEDICINE

## 2023-02-13 RX ORDER — CEPHALEXIN 500 MG/1
500 CAPSULE ORAL 2 TIMES DAILY
Qty: 14 CAPSULE | Refills: 0 | Status: SHIPPED | OUTPATIENT
Start: 2023-02-13 | End: 2023-02-20

## 2023-02-13 RX ORDER — KETOROLAC TROMETHAMINE 30 MG/ML
30 INJECTION, SOLUTION INTRAMUSCULAR; INTRAVENOUS ONCE
Status: COMPLETED | OUTPATIENT
Start: 2023-02-13 | End: 2023-02-13

## 2023-02-13 RX ADMIN — KETOROLAC TROMETHAMINE 30 MG: 30 INJECTION, SOLUTION INTRAMUSCULAR; INTRAVENOUS at 13:34

## 2023-02-13 ASSESSMENT — PAIN - FUNCTIONAL ASSESSMENT: PAIN_FUNCTIONAL_ASSESSMENT: 0-10

## 2023-02-13 ASSESSMENT — ENCOUNTER SYMPTOMS
DIARRHEA: 0
VOMITING: 0
RHINORRHEA: 0
SHORTNESS OF BREATH: 0
COUGH: 0
NAUSEA: 0
BACK PAIN: 1
ABDOMINAL PAIN: 0
CONSTIPATION: 0
SORE THROAT: 0

## 2023-02-13 ASSESSMENT — PAIN SCALES - GENERAL: PAINLEVEL_OUTOF10: 9

## 2023-02-13 NOTE — ED NOTES
Pt arrived with report of white vaginal discharge and odor. Pt also report left flank pain with tenderness. Pt denies urinary changes. Denies pelvic pain or vaginal pain. Denies vaginal bleeding or hematuria. Pt also reports pain r/t neuropathy. Pt states she recently changed PCPs and does not have her neuropathy medications.  Pt A&Ox4, RR even/unlabored, call light within reach      [facility], RN  02/13/23 0421

## 2023-02-13 NOTE — ED PROVIDER NOTES
[address] [facility]     Emergency Department     Faculty Attestation    I performed a history and physical examination of the patient and discussed management with the resident. I have reviewed and agree with the residents findings including all diagnostic interpretations, and treatment plans as written at the time of my review. Any areas of disagreement are noted on the chart. I was personally present for the key portions of any procedures. I have documented in the chart those procedures where I was not present during the key portions. For Physician Assistant/ Nurse Practitioner cases/documentation I have personally evaluated this patient and have completed at least one if not all key elements of the E/M (history, physical exam, and MDM). Additional findings are as noted. Primary Care Physician: Digna Maravilla MD    Note Started: 1:27 PM EST    History: This is a 50 y.o. female who presents to the Emergency Department with complaint of vaginal discharge. Patient presents emergency room complaint of vaginal discharge ongoing for the past several days. She denies any abdominal pain. Denies any dysuria, frequency or urgency. Patient states her last menstrual period was approximately 2 weeks ago. Physical:   weight is 140 lb (63.5 kg). Her oral temperature is 98.1 °F (36.7 °C). Her blood pressure is 120/81 and her pulse is 101 (abnormal). Her respiration is 16 and oxygen saturation is 100%. Abdomen is soft nontender, pelvic exam performed by the resident. Impression: Vaginal discharge    Plan: Urinalysis, hCG, pelvic labs      Medical Decision Making  Amount and/or Complexity of Data Reviewed  Labs: ordered. Decision-making details documented in ED Course. Risk  Prescription drug management.       (Please note that portions of this note were completed with a voice recognition program.  Efforts were made to edit the dictations but occasionally words are mis-transcribed.)    Dao Caceres Aas, MD, [facility]  Attending Emergency Medicine Physician        Duane Murphy MD  02/13/23 [id number]

## 2023-02-13 NOTE — DISCHARGE INSTRUCTIONS
You are seen in the ER today for your vaginal discharge. We checked a urinalysis on you as well as swabbed you for vaginal infections. The swab that comes back today that checks for bacterial vaginosis, trichomonas, and yeast infection was negative. As we discussed, the chlamydia and gonorrhea swab will not come back today. Your urine showed signs of urinary tract infection. We will treat you with an antibiotic called Keflex. Please take this twice a day for 7 days. Be sure to take this until its gone. Please return the emergency department if you develop worsening abdominal pain, weakness in your legs, chest pain, shortness of breath, or any other concerning symptoms. PLEASE RETURN TO THE EMERGENCY DEPARTMENT IMMEDIATELY if you develop any concerning symptoms such as: chest pain, shortness of breath, feeling like your heart is racing, high fever not relieved by acetaminophen (Tylenol) and/or ibuprofen (Motrin / Advil), chills, persistent nausea and/or vomiting, loss of consciousness, numbness, weakness or tingling in the arms or legs or change in color of the extremities, changes in mental status, persistent or severe headache, blurry vision, loss of bladder / bowel control, unable to follow up with your physician, or other any other care or concern.

## 2023-02-13 NOTE — ED PROVIDER NOTES
[facility] ED  Emergency Department Encounter  Emergency Medicine Resident     Pt Name:Hetal Houston  MRN: [id number]  [place] 1975  Date of evaluation: 2/13/23  PCP:  Jennifer Jin MD  Note Started: 12:36 PM EST      CHIEF COMPLAINT       Chief Complaint   Patient presents with    Vaginal Discharge       HISTORY OF PRESENT ILLNESS  (Location/Symptom, Timing/Onset, Context/Setting, Quality, Duration, Modifying Factors, Severity.)      Carole Ndiaye is a 50 y.o. female who presents with concern for sexually transmitted infection exposure. Patient states she has had vaginal discharge and vaginal odor for an unknown amount of time. She states she noticed it first after a recent partner told her that he had tested positive for an STI. The patient states she does not know which one he tested positive for. Patient states his partner states he got this infection from her. Patient also endorses left-sided back pain ongoing for 2 weeks. She states she has no known injury. She denies any loss of bowel or bladder. Denies any fevers or chills. Patient denies any history of IV drug use. Patient denies dysuria or hematuria. Patient additionally states she is having a flareup of her neuropathic pain. She states this is the same as her usual neuropathy pain, however it is worse. Patient states she takes Lyrica for this usually. PAST MEDICAL / SURGICAL / SOCIAL / FAMILY HISTORY      has a past medical history of Asthma, Bipolar disorder ([address] Depression, Diabetes mellitus ([address] GERD (gastroesophageal reflux disease), and Tobacco abuse.     has a past surgical history that includes Tubal ligation and Pharyngeal Biopsy.       Social History     Socioeconomic History    Marital status:      Spouse name: Not on file    Number of children: Not on file    Years of education: Not on file    Highest education level: Not on file   Occupational History    Not on file Tobacco Use    Smoking status: Every Day     Packs/day: 1.00     Years: 30.00     Pack years: 30.00     Types: Cigarettes    Smokeless tobacco: Never   Substance and Sexual Activity    Alcohol use: Yes     Comment: socailly    Drug use: No    Sexual activity: Yes     Partners: Male   Other Topics Concern    Not on file   Social History Narrative    ** Merged History Encounter **          Social Determinants of Health     Financial Resource Strain: High Risk    Difficulty of Paying Living Expenses: Hard   Food Insecurity: Food Insecurity Present    Worried About Running Out of Food in the Last Year: Often true    Ran Out of Food in the Last Year: Often true   Transportation Needs: Not on file   Physical Activity: Not on file   Stress: Not on file   Social Connections: Not on file   Intimate Partner Violence: Not on file   Housing Stability: Not on file       Family History   Problem Relation Age of Onset    Cancer Mother         pancreatic cancer    Diabetes Mother     Hypertension Mother     Emphysema Father     Cancer Father         stomach       Allergies:  Lisinopril, Metformin, Percocet [oxycodone-acetaminophen], Percocet [oxycodone-acetaminophen], and Oxycodone-acetaminophen    Home Medications:  Prior to Admission medications    Medication Sig Start Date End Date Taking?  Authorizing Provider   cephALEXin (KEFLEX) 500 MG capsule Take 1 capsule by mouth 2 times daily for 7 days 2/13/23 2/20/23 Yes Yarelis Tatum DO   polyethylene glycol [facility]) 17 GM/SCOOP powder Use as directed for bowel prep 12/7/22   Suzanne Christianson MD   bisacodyl 5 MG EC tablet Use as directed for bowel prep 12/7/22   Suzanne Christianson MD   ondansetron (ZOFRAN-ODT) 4 MG disintegrating tablet Take 1 tablet by mouth 3 times daily as needed for Nausea or Vomiting 11/25/22   Timoteo Lauren MD   pregabalin (LYRICA) 150 MG capsule Take 1 capsule two times a day 9/13/22 9/9/23  Arvind Llamas MD   ondansetron (ZOFRAN) 4 MG tablet take 1 tablet by mouth three times a day if needed nausea and vomiting 9/9/22   Shruthi Perkins MD   polyethylene glycol [facility]) 17 GM/SCOOP powder Take as directed for bowel prep/colonoscopy 7/7/22   Lee Goodpasture, MD   bisacodyl (BISACODYL) 5 MG EC tablet Take as directed for bowel prep/Colonoscopy 7/7/22   Lee Goodpasture, MD   blood glucose monitor kit and supplies Dispense sufficient amount for indicated testing frequency plus additional to accommodate PRN testing needs. Dispense all needed supplies to include: monitor, strips, lancing device, lancets, control solutions, alcohol swabs. 6/9/21   LYLY Calix - NP       REVIEW OF SYSTEMS       Review of Systems   Constitutional:  Negative for chills and fever. HENT:  Negative for congestion, rhinorrhea and sore throat. Eyes:  Negative for visual disturbance. Respiratory:  Negative for cough and shortness of breath. Cardiovascular:  Negative for chest pain and leg swelling. Gastrointestinal:  Negative for abdominal pain, constipation, diarrhea, nausea and vomiting. Endocrine: Negative for polyuria. Genitourinary:  Positive for vaginal discharge. Negative for dysuria and hematuria. Musculoskeletal:  Positive for back pain. Negative for arthralgias and myalgias. Skin:  Negative for rash. Neurological:  Negative for light-headedness and headaches. Psychiatric/Behavioral:  Negative for behavioral problems. PHYSICAL EXAM      INITIAL VITALS:   /81   Pulse 78   Temp 98.1 °F (36.7 °C) (Oral)   Resp 16   Wt 140 lb (63.5 kg)   SpO2 100%   BMI 20.98 kg/m²     Physical Exam  Constitutional:       General: She is not in acute distress. Appearance: She is not toxic-appearing. HENT:      Head: Normocephalic. Nose: No rhinorrhea. Mouth/Throat:      Mouth: Mucous membranes are moist.   Eyes:      Conjunctiva/sclera: Conjunctivae normal.      Pupils: Pupils are equal, round, and reactive to light.    Cardiovascular:      Rate and Rhythm: Normal rate and regular rhythm. Pulses: Normal pulses. Pulmonary:      Effort: Pulmonary effort is normal. No respiratory distress. Breath sounds: Normal breath sounds. No wheezing. Abdominal:      General: Bowel sounds are normal. There is no distension. Palpations: Abdomen is soft. Tenderness: There is no abdominal tenderness. There is no rebound. Musculoskeletal:      Right lower leg: No edema. Left lower leg: No edema. Comments: No midline tenderness to palpation of the CTL spine. Paraspinal muscular tenderness on the left side. No CVA tenderness. Equal strength and sensation in the bilateral lower extremities. Skin:     General: Skin is warm and dry. Neurological:      Mental Status: She is alert and oriented to person, place, and time. Psychiatric:         Mood and Affect: Mood normal.         Behavior: Behavior normal.         Judgment: Judgment normal.         DDX/DIAGNOSTIC RESULTS / EMERGENCY DEPARTMENT COURSE / MDM     Medical Decision Making  66-year-old female who presents with vaginal discharge, back pain, and neuropathy pain. On exam, patient is mildly tachycardic, but otherwise very well-appearing and vitally stable. Patient has no midline tenderness to palpation of the CTL spine. There is paraspinal tenderness on the left side. No true CVA tenderness. There is purulent drainage in the vaginal vault, none from the cervical os. No cervical motion tenderness, adnexal tenderness, uterine tenderness. Differential includes PID, vaginitis, urinary tract infection, pyelonephritis, cauda equina syndrome, epidural abscess, musculoskeletal back pain. Cauda equina syndrome unlikely as the patient has not had any bowel or bladder symptoms and additionally has no saddle anesthesia. Epidural abscess unlikely as the patient has no fever, no history of IV drug use.   There is no known trauma to the area, no midline tenderness to palpation, fracture unlikely. Amount and/or Complexity of Data Reviewed  Labs: ordered. Risk  Prescription drug management. Critical Care  Total time providing critical care: < 30 minutes      EMERGENCY DEPARTMENT COURSE:    ED Course as of 02/13/23 1432   Mon Feb 13, 2023   1353 Urinalysis positive for urinary tract infection. We will treat the patient with keflex. [BL]   1354 Patient's vaginitis probe negative. Discussed with the patient that her chlamydia gonorrhea result will not come back for a few days and she will be contacted if it is positive. Discussed follow-up and return precautions with the patient. Patient verbalized understanding all questions were answered. [BL]      ED Course User Index  [BL] Antonio Chapman DO       PROCEDURES:  N/A    CONSULTS:  None    CRITICAL CARE:  There was significant risk of life threatening deterioration of patient's condition requiring my direct management. Critical care time 0 minutes, excluding any documented procedures. FINAL IMPRESSION      1. Acute cystitis with hematuria          DISPOSITION / PLAN     DISPOSITION Decision To Discharge 02/13/2023 02:21:46 PM      PATIENT REFERRED TO:  Lou Leslie MD  [address]   [address]  [id number]  [phone]      For post-ER visit assessment and medication refills    DISCHARGE MEDICATIONS:  Discharge Medication List as of 2/13/2023  2:23 PM        START taking these medications    Details   cephALEXin (KEFLEX) 500 MG capsule Take 1 capsule by mouth 2 times daily for 7 days, Disp-14 capsule, R-0Print             Antonio Chapman DO  Emergency Medicine Resident    (Please note that portions of thisnote were completed with a voice recognition program.  Efforts were made to edit the dictations but occasionally words are mis-transcribed.)       Antonio Chapman DO  Resident  02/13/23 1432

## 2023-02-14 LAB
C TRACH DNA SPEC QL PROBE+SIG AMP: NEGATIVE
MICROORGANISM SPEC CULT: ABNORMAL
N GONORRHOEA DNA SPEC QL PROBE+SIG AMP: ABNORMAL
SPECIMEN DESCRIPTION: ABNORMAL
SPECIMEN DESCRIPTION: ABNORMAL

## 2023-02-15 ENCOUNTER — TELEPHONE (OUTPATIENT)
Dept: PHARMACY | Age: 48
End: 2023-02-15

## 2023-02-15 NOTE — PROGRESS NOTES
Reviewed patient's urine culture - culture positive for E. coli. Patient was discharged on cephalexin, and culture is sensitive to prescribed medication. Antibiotic prescribed at discharge is appropriate - no changes made to antibiotic regimen.      Leonardo Freedman PharmD, BCCCP  2/15/2023  10:22 AM

## 2023-03-30 ENCOUNTER — TELEPHONE (OUTPATIENT)
Dept: FAMILY MEDICINE CLINIC | Age: 48
End: 2023-03-30

## 2023-03-30 NOTE — TELEPHONE ENCOUNTER
Pt contacted office and is currently off of antibiotics and now believe has yeast infection. Pt having vaginal discharge, yellowish pt stated, and a lot of vaginal itching.  Wondering if could have something called in to [facility] on [address].

## 2023-04-03 DIAGNOSIS — N89.8 VAGINAL ITCHING: Primary | ICD-10-CM

## 2023-04-03 RX ORDER — FLUCONAZOLE 150 MG/1
150 TABLET ORAL ONCE
Qty: 1 TABLET | Refills: 0 | Status: SHIPPED | OUTPATIENT
Start: 2023-04-03 | End: 2023-04-03

## 2023-04-05 NOTE — TELEPHONE ENCOUNTER
Provider did reach out and informed medication was sent out on 4-3-23 to [facility] on [address]. Writer did speak to pt and apologized and informed medication was sent on 4-3-23. Pt voiced understanding.

## 2024-06-12 NOTE — TELEPHONE ENCOUNTER
Patient called today asking if she can have a refill. Informed patient she was told yesterday that she should have enough and is not due for refills at this time. Patient verbalized understanding and ended the call.
Pt called in regards to Lyrica refill. I let her know that [address] stated that the script was picked up on 4/14/2021 and 4/28/2021. Per Cristian Joe should have enough caps and no refill due at this time.  Pt states will check with her daughter
Yamileth S Payam Haro in [place] called for refill on pregablin patient moved closer to that pharmacy. Patient picked up prescription from [facility] on 4/14 and 4/28.    Please advise
Current and Past Psychiatric Diagnoses

## 2024-08-26 ENCOUNTER — HOSPITAL ENCOUNTER (EMERGENCY)
Age: 49
Discharge: HOME | End: 2024-08-26
Attending: EMERGENCY MEDICINE
Payer: COMMERCIAL

## 2024-08-26 VITALS
RESPIRATION RATE: 18 BRPM | DIASTOLIC BLOOD PRESSURE: 74 MMHG | WEIGHT: 165 LBS | SYSTOLIC BLOOD PRESSURE: 117 MMHG | HEIGHT: 70 IN | TEMPERATURE: 98.1 F | HEART RATE: 89 BPM | BODY MASS INDEX: 23.62 KG/M2 | OXYGEN SATURATION: 94 %

## 2024-08-26 DIAGNOSIS — N76.0 BACTERIAL VAGINOSIS: ICD-10-CM

## 2024-08-26 DIAGNOSIS — N30.00 ACUTE CYSTITIS WITHOUT HEMATURIA: ICD-10-CM

## 2024-08-26 DIAGNOSIS — L72.3 SEBACEOUS CYST: Primary | ICD-10-CM

## 2024-08-26 DIAGNOSIS — B96.89 BACTERIAL VAGINOSIS: ICD-10-CM

## 2024-08-26 LAB
BACTERIA URNS QL MICRO: ABNORMAL
BILIRUB UR QL STRIP: NEGATIVE
CANDIDA SPECIES: NEGATIVE
CASTS #/AREA URNS LPF: ABNORMAL /LPF (ref 0–8)
CLARITY UR: CLEAR
COLOR UR: YELLOW
EPI CELLS #/AREA URNS HPF: ABNORMAL /HPF (ref 0–5)
GARDNERELLA VAGINALIS: POSITIVE
GLUCOSE UR STRIP-MCNC: ABNORMAL MG/DL
HCG UR QL: NEGATIVE
HGB UR QL STRIP.AUTO: NEGATIVE
HIV 1+2 AB+HIV1 P24 AG SERPL QL IA: NONREACTIVE
KETONES UR STRIP-MCNC: NEGATIVE MG/DL
LEUKOCYTE ESTERASE UR QL STRIP: NEGATIVE
NITRITE UR QL STRIP: NEGATIVE
PH UR STRIP: 5.5 [PH] (ref 5–8)
PROT UR STRIP-MCNC: NEGATIVE MG/DL
RBC #/AREA URNS HPF: ABNORMAL /HPF (ref 0–4)
SOURCE: ABNORMAL
SP GR UR STRIP: 1.04 (ref 1–1.03)
T PALLIDUM AB SER QL IA: NONREACTIVE
TRICHOMONAS: NEGATIVE
UROBILINOGEN UR STRIP-ACNC: NORMAL EU/DL (ref 0–1)
WBC #/AREA URNS HPF: ABNORMAL /HPF (ref 0–5)

## 2024-08-26 PROCEDURE — 87510 GARDNER VAG DNA DIR PROBE: CPT

## 2024-08-26 PROCEDURE — 87186 SC STD MICRODIL/AGAR DIL: CPT

## 2024-08-26 PROCEDURE — 87491 CHLMYD TRACH DNA AMP PROBE: CPT

## 2024-08-26 PROCEDURE — 87660 TRICHOMONAS VAGIN DIR PROBE: CPT

## 2024-08-26 PROCEDURE — 81025 URINE PREGNANCY TEST: CPT

## 2024-08-26 PROCEDURE — 87086 URINE CULTURE/COLONY COUNT: CPT

## 2024-08-26 PROCEDURE — 87389 HIV-1 AG W/HIV-1&-2 AB AG IA: CPT

## 2024-08-26 PROCEDURE — 81001 URINALYSIS AUTO W/SCOPE: CPT

## 2024-08-26 PROCEDURE — 99283 EMERGENCY DEPT VISIT LOW MDM: CPT

## 2024-08-26 PROCEDURE — 86780 TREPONEMA PALLIDUM: CPT

## 2024-08-26 PROCEDURE — 87480 CANDIDA DNA DIR PROBE: CPT

## 2024-08-26 PROCEDURE — 87077 CULTURE AEROBIC IDENTIFY: CPT

## 2024-08-26 PROCEDURE — 87591 N.GONORRHOEAE DNA AMP PROB: CPT

## 2024-08-26 RX ORDER — METRONIDAZOLE 500 MG/1
500 TABLET ORAL 2 TIMES DAILY
Qty: 14 TABLET | Refills: 0 | Status: SHIPPED | OUTPATIENT
Start: 2024-08-26 | End: 2024-09-02

## 2024-08-26 RX ORDER — CEPHALEXIN 500 MG/1
500 CAPSULE ORAL 2 TIMES DAILY
Qty: 14 CAPSULE | Refills: 0 | Status: SHIPPED | OUTPATIENT
Start: 2024-08-26 | End: 2024-09-02

## 2024-08-26 ASSESSMENT — ENCOUNTER SYMPTOMS
FACIAL SWELLING: 1
VOMITING: 0
NAUSEA: 0
SORE THROAT: 0
TROUBLE SWALLOWING: 0

## 2024-08-26 ASSESSMENT — LIFESTYLE VARIABLES
HOW OFTEN DO YOU HAVE A DRINK CONTAINING ALCOHOL: MONTHLY OR LESS
HOW MANY STANDARD DRINKS CONTAINING ALCOHOL DO YOU HAVE ON A TYPICAL DAY: 1 OR 2

## 2024-08-26 ASSESSMENT — PAIN DESCRIPTION - DESCRIPTORS: DESCRIPTORS: BURNING

## 2024-08-26 ASSESSMENT — PAIN SCALES - GENERAL: PAINLEVEL_OUTOF10: 6

## 2024-08-26 ASSESSMENT — PAIN DESCRIPTION - LOCATION: LOCATION: FACE

## 2024-08-26 ASSESSMENT — PAIN - FUNCTIONAL ASSESSMENT: PAIN_FUNCTIONAL_ASSESSMENT: 0-10

## 2024-08-26 NOTE — ED PROVIDER NOTES
DIAGNOSTIC RESULTS       RADIOLOGY:   No orders to display         LABS:  Labs Reviewed   C.TRACHOMATIS N.GONORRHOEAE DNA   CULTURE, URINE   VAGINITIS DNA PROBE   URINALYSIS WITH MICROSCOPIC   PREGNANCY, URINE   HIV SCREEN   T. PALLIDUM AB         EMERGENCY DEPARTMENT COURSE:     -------------------------      BP: 117/74, Temp: 98.1 °F (36.7 °C), Pulse: 89, Respirations: 18    System Problem List Noted    Patient Active Problem List   Diagnosis    Chronic painful diabetic neuropathy (HCC)    Corneal opacity, central    Dyslipidemia    Meibomian gland dysfunction (MGD), bilateral, both upper and lower lids    Mental developmental delay    Sleep disorder with mood complaints    Slow transit constipation    Tobacco dependence    Bilateral foot-drop    Bipolar affective disorder (HCC)    Type 2 diabetes mellitus without complication, without long-term current use of insulin (HCC)    Need for prophylactic vaccination against Streptococcus pneumoniae (pneumococcus)    Encounter for medication refill    Idiopathic peripheral neuropathy    Nausea    Prediabetes         Active ED Problem List [facility]/  MDM  ---------------------  Medical Decision Making  Amount and/or Complexity of Data Reviewed  Labs: ordered.                No notes of EC Admission Criteria type on file.          Armando Long MD,, MD, F.A.C.E.P.  Attending Emergency Physician         Armando Long MD  08/26/24 1510

## 2024-08-26 NOTE — ED TRIAGE NOTES
Pt to ed c/o abscess, polyuria, and std exposure. Per pt she has had 3 abscess on her face for about 6 months, but they started to hurt over the past few weeks. Pt states she has never been seen for the abscess before. Pt states she noticed she has been urinating more than usual over the last few weeks. Pt denies burning with urination. Denies back pain.     Pt is alert and oriented x4. Ambulatory to room. Vital stable. Call light in reach. Will continue with plan of care.

## 2024-08-26 NOTE — DISCHARGE INSTRUCTIONS
Take the antibiotics as prescribed. Keflex will help treat any infection surrounding the cyst as well as your urinary tract infection. Flagyl will treat the bacterial vaginosis infection.     Follow up with Dr. Arrieta, the dermatologist.     Take your medication as indicated and prescribed.  If you are given an antibiotic then, make sure you get the prescription filled and take the antibiotics until finished.  Drink plenty of water while taking the antibiotics.  Avoid drinking alcohol or drinks that have caffeine in it while taking antibiotics.   If you were given the medication pyridium (or take over the counter Azo) - do not wear any contacts for the next week since this medication will turn your tears an orange color and will stain the contacts.      For pain use acetaminophen (Tylenol) or ibuprofen (Motrin / Advil), unless prescribed medications that have acetaminophen or ibuprofen (or similar medications) in it.  You can take over the counter acetaminophen tablets (1 - 2 tablets of the 500-mg strength every 6 hours) or ibuprofen tablets (2 tablets every 4 hours).    PLEASE RETURN TO THE EMERGENCY DEPARTMENT IMMEDIATELY for worsening symptoms, inability to urinate, worsening of blood in your urine, or if you develop any concerning symptoms such as: high fever not relieved by acetaminophen (Tylenol) and/or ibuprofen (Motrin / Advil), chills, shortness of breath, chest pain, feeling of your heart fluttering or racing, persistent nausea and/or vomiting, vomiting up blood, blood in your stool, loss of consciousness, numbness, weakness or tingling in the arms or legs or change in color of the extremities, changes in mental status, persistent headache, blurry vision, loss of bladder / bowel.

## 2024-08-26 NOTE — ED PROVIDER NOTES
Note Started: 3:46 PM EDT     Faculty Sign-Out Attestation  Handoff taken on the following patient from prior Attending Physician at 15:30: Ethan    I was available and discussed any additional care issues that arose and coordinated the management plans with the resident(s) caring for the patient during my duty period. Any areas of disagreement with resident’s documentation of care or procedures are noted on the chart. I was personally present for the key portions of any/all procedures during my duty period. I have documented in the chart those procedures where I was not present during the key portions.    49-year-old female here for STI screening.  Also has sebaceous cyst on cheek and forehead.  Plan to refer to dermatology.    Tanya Peña MD  Attending Physician        Tanya Peña MD  08/26/24 1844

## 2024-08-26 NOTE — ED PROVIDER NOTES
peripheral neuropathy, Nausea, and Under care of team.       has a past surgical history that includes Tubal ligation and Pharyngeal Biopsy.      Social History     Socioeconomic History    Marital status:      Spouse name: Not on file    Number of children: Not on file    Years of education: Not on file    Highest education level: Not on file   Occupational History    Not on file   Tobacco Use    Smoking status: Every Day     Current packs/day: 1.00     Average packs/day: 1 pack/day for 30.0 years (30.0 ttl pk-yrs)     Types: Cigarettes    Smokeless tobacco: Never   Substance and Sexual Activity    Alcohol use: Yes     Comment: socailly    Drug use: No    Sexual activity: Yes     Partners: Male   Other Topics Concern    Not on file   Social History Narrative    ** Merged History Encounter **          Social Determinants of Health     Financial Resource Strain: High Risk (9/26/2023)    Received from [facility]    Overall Financial Resource Strain (CARDIA)     Difficulty of Paying Living Expenses: Very hard   Food Insecurity: Food Insecurity Present (9/26/2023)    Received from [facility]    Hunger Vital Sign     Within the past 12 months, you worried that your food would run out before you got the money to buy more.: Often true     Ran Out of Food in the Last Year: Not on file   Transportation Needs: Unmet Transportation Needs (9/26/2023)    Received from [facility]    Transportation     In the past 12 months, has lack of transportation kept you from medical appointments or from getting medications?: Yes     Lack of Transportation (Non-Medical): Not on file   Physical Activity: Not on file   Stress: Not on file   Social Connections: Not on file   Intimate Partner Violence: Unknown (2/22/2024)    Received from [facility]    [place] Safety & Environment     Fear of Current or Ex-Partner: Not on file     Emotionally Abused: Not on file     Physically Abused:  deficit present.      Mental Status: She is alert and oriented to person, place, and time.       DDX/DIAGNOSTIC RESULTS / EMERGENCY DEPARTMENT COURSE / MDM     Medical Decision Making  Patient is a 50yo F who presents with a lump on her left cheek for the last 6 months. Her presentation is concerning for abscess vs cyst without surrounding tissues involvement. Appearance and chronicity of the lump is more consistent with cysts. Pt informed that given the location and nature of her cysts, she would benefit from being treated by a dermatologist.   Given that there is no swelling or tracking around the lump and in the absence of fevers/chills, our concern for abscess/infectious process is less. There is no posterior auricular tenderness or tenderness to facial bones. Patient is able to eat, drink, and open her mouth without difficulties. No indication for further labs or imaging at this time.     In the setting of her genitourinary symptoms, we will obtain a UA and STD testing to evaluate for infections. We will also obtain a pregnancy test. Pt is amenable to self swabbing given her lack of abdominal pain or other indications for a pelvic exam. Dispo pending w/u.     Amount and/or Complexity of Data Reviewed  Labs: ordered.    Risk  Prescription drug management.      EMERGENCY DEPARTMENT COURSE:      ED Course as of 08/26/24 1822   Mon Aug 26, 2024   1821 Glucose, Ur(!): 3+ [JR]   1821 WBC, UA: 10 TO 20 [JR]   1821 Bacteria, UA(!): MODERATE [JR]   1822 Epithelial Cells, UA: 0 TO 2 [JR]   1822 T. pallidum, IgG: NONREACTIVE [JR]   1822 HIV Ag/Ab: NONREACTIVE [JR]   1822 GARDNERELLA VAGINALIS(!): POSITIVE [JR]   1822 Trichomonas: NEGATIVE [JR]   1822 Candida Species: NEGATIVE [JR]   1822 Pregnancy, Urine: NEGATIVE  Patient did not want prophylactic treatment for STDs at this time.  States that she will follow-up the results on MyChart. [JR]   1822 Discussed that she was positive for bacterial vaginosis, has a mild UTI.

## 2024-08-26 NOTE — ED NOTES
The following labs were labeled with appropriate pt sticker and tubed to lab:     [] Blue     [x] Lavender   [] on ice  [x] Green/yellow  [] Green/black [] on ice  [] [place]  [] on ice  [x] Yellow  [] Red  [] Pink  [] Type/ Screen  [] ABG  [] VBG    [] COVID-19 swab    [] Rapid  [] PCR  [] Flu swab  [] Peds Viral Panel     [] Urine Sample  [] Fecal Sample  [x] Pelvic Cultures  [] Blood Cultures  [] X 2  [] STREP Cultures  [] Wound Cultures

## 2024-08-27 LAB
C TRACH DNA SPEC QL PROBE+SIG AMP: NEGATIVE
N GONORRHOEA DNA SPEC QL PROBE+SIG AMP: NEGATIVE
SPECIMEN DESCRIPTION: NORMAL

## 2024-08-29 LAB
MICROORGANISM SPEC CULT: ABNORMAL
SERVICE CMNT-IMP: ABNORMAL
SPECIMEN DESCRIPTION: ABNORMAL

## 2024-08-30 NOTE — PROGRESS NOTES
Reviewed patient's urine culture - culture positive for E. Coli.  Patient was discharged on keflex, and culture is sensitive to prescribed medication.  Antibiotic prescribed at discharge is appropriate - no changes made to antibiotic regimen.     Signed Lissette Santillan, PharmD Candidate

## 2024-10-25 ENCOUNTER — HOSPITAL ENCOUNTER (OUTPATIENT)
Dept: MAMMOGRAPHY | Age: 49
Discharge: HOME | End: 2024-10-27
Payer: MEDICAID

## 2024-10-25 DIAGNOSIS — Z12.31 ENCOUNTER FOR SCREENING MAMMOGRAM FOR MALIGNANT NEOPLASM OF BREAST: ICD-10-CM

## 2024-10-25 PROCEDURE — 77063 BREAST TOMOSYNTHESIS BI: CPT
